# Patient Record
Sex: FEMALE | Race: WHITE | NOT HISPANIC OR LATINO | Employment: OTHER | URBAN - METROPOLITAN AREA
[De-identification: names, ages, dates, MRNs, and addresses within clinical notes are randomized per-mention and may not be internally consistent; named-entity substitution may affect disease eponyms.]

---

## 2017-01-01 ENCOUNTER — TRANSCRIBE ORDERS (OUTPATIENT)
Dept: ADMINISTRATIVE | Facility: HOSPITAL | Age: 82
End: 2017-01-01

## 2018-01-01 ENCOUNTER — APPOINTMENT (INPATIENT)
Dept: CT IMAGING | Facility: HOSPITAL | Age: 83
DRG: 177 | End: 2018-01-01
Payer: MEDICARE

## 2018-01-01 ENCOUNTER — APPOINTMENT (EMERGENCY)
Dept: RADIOLOGY | Facility: HOSPITAL | Age: 83
DRG: 177 | End: 2018-01-01
Payer: MEDICARE

## 2018-01-01 ENCOUNTER — HOSPITAL ENCOUNTER (INPATIENT)
Facility: HOSPITAL | Age: 83
LOS: 3 days | DRG: 177 | End: 2018-01-17
Attending: EMERGENCY MEDICINE
Payer: MEDICARE

## 2018-01-01 VITALS
DIASTOLIC BLOOD PRESSURE: 42 MMHG | HEART RATE: 97 BPM | OXYGEN SATURATION: 91 % | SYSTOLIC BLOOD PRESSURE: 102 MMHG | TEMPERATURE: 99.9 F | HEIGHT: 60 IN | RESPIRATION RATE: 22 BRPM | BODY MASS INDEX: 32.3 KG/M2 | WEIGHT: 164.5 LBS

## 2018-01-01 DIAGNOSIS — N18.9 CHRONIC RENAL INSUFFICIENCY: ICD-10-CM
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
## 2018-01-01 DIAGNOSIS — J18.9 HOSPITAL-ACQUIRED PNEUMONIA: Primary | ICD-10-CM

## 2018-01-01 DIAGNOSIS — E86.0 DEHYDRATION: ICD-10-CM

## 2018-01-01 LAB
ABO GROUP BLD: NORMAL
ALBUMIN SERPL BCP-MCNC: 1.9 G/DL (ref 3.5–5)
ALBUMIN SERPL BCP-MCNC: 2.3 G/DL (ref 3.5–5)
ALBUMIN SERPL BCP-MCNC: 2.7 G/DL (ref 3.5–5)
ALP SERPL-CCNC: 32 U/L (ref 46–116)
ALP SERPL-CCNC: 38 U/L (ref 46–116)
ALP SERPL-CCNC: 42 U/L (ref 46–116)
ALT SERPL W P-5'-P-CCNC: 15 U/L (ref 12–78)
ALT SERPL W P-5'-P-CCNC: 17 U/L (ref 12–78)
ALT SERPL W P-5'-P-CCNC: 19 U/L (ref 12–78)
ANION GAP SERPL CALCULATED.3IONS-SCNC: 11 MMOL/L (ref 4–13)
ANION GAP SERPL CALCULATED.3IONS-SCNC: 4 MMOL/L (ref 4–13)
ANION GAP SERPL CALCULATED.3IONS-SCNC: 7 MMOL/L (ref 4–13)
ANION GAP SERPL CALCULATED.3IONS-SCNC: 8 MMOL/L (ref 4–13)
ANISOCYTOSIS BLD QL SMEAR: PRESENT
APTT PPP: 60 SECONDS (ref 23–35)
AST SERPL W P-5'-P-CCNC: 15 U/L (ref 5–45)
AST SERPL W P-5'-P-CCNC: 19 U/L (ref 5–45)
AST SERPL W P-5'-P-CCNC: 20 U/L (ref 5–45)
ATRIAL RATE: 170 BPM
BASO STIPL BLD QL SMEAR: PRESENT
BASOPHILS # BLD AUTO: 0.03 THOUSANDS/ΜL (ref 0–0.1)
BASOPHILS # BLD AUTO: 0.03 THOUSANDS/ΜL (ref 0–0.1)
BASOPHILS # BLD MANUAL: 0 THOUSAND/UL (ref 0–0.1)
BASOPHILS NFR BLD AUTO: 1 % (ref 0–1)
BASOPHILS NFR BLD AUTO: 1 % (ref 0–1)
BASOPHILS NFR MAR MANUAL: 0 % (ref 0–1)
BILIRUB SERPL-MCNC: 0.4 MG/DL (ref 0.2–1)
BILIRUB SERPL-MCNC: 0.5 MG/DL (ref 0.2–1)
BILIRUB SERPL-MCNC: 0.5 MG/DL (ref 0.2–1)
BLD GP AB SCN SERPL QL: NEGATIVE
BUN SERPL-MCNC: 19 MG/DL (ref 5–25)
BUN SERPL-MCNC: 20 MG/DL (ref 5–25)
BUN SERPL-MCNC: 20 MG/DL (ref 5–25)
BUN SERPL-MCNC: 24 MG/DL (ref 5–25)
BUN SERPL-MCNC: 25 MG/DL (ref 5–25)
BUN SERPL-MCNC: 27 MG/DL (ref 5–25)
CALCIUM SERPL-MCNC: 7.1 MG/DL (ref 8.3–10.1)
CALCIUM SERPL-MCNC: 8.7 MG/DL (ref 8.3–10.1)
CALCIUM SERPL-MCNC: 8.9 MG/DL (ref 8.3–10.1)
CALCIUM SERPL-MCNC: 9.1 MG/DL (ref 8.3–10.1)
CALCIUM SERPL-MCNC: 9.1 MG/DL (ref 8.3–10.1)
CALCIUM SERPL-MCNC: 9.4 MG/DL (ref 8.3–10.1)
CHLORIDE SERPL-SCNC: 109 MMOL/L (ref 100–108)
CHLORIDE SERPL-SCNC: 109 MMOL/L (ref 100–108)
CHLORIDE SERPL-SCNC: 110 MMOL/L (ref 100–108)
CHLORIDE SERPL-SCNC: 111 MMOL/L (ref 100–108)
CHLORIDE SERPL-SCNC: 111 MMOL/L (ref 100–108)
CHLORIDE SERPL-SCNC: 116 MMOL/L (ref 100–108)
CLARITY, POC: NORMAL
CO2 SERPL-SCNC: 26 MMOL/L (ref 21–32)
CO2 SERPL-SCNC: 27 MMOL/L (ref 21–32)
CO2 SERPL-SCNC: 28 MMOL/L (ref 21–32)
CO2 SERPL-SCNC: 28 MMOL/L (ref 21–32)
CO2 SERPL-SCNC: 29 MMOL/L (ref 21–32)
CO2 SERPL-SCNC: 33 MMOL/L (ref 21–32)
COLOR, POC: YELLOW
CREAT SERPL-MCNC: 1 MG/DL (ref 0.6–1.3)
CREAT SERPL-MCNC: 1.25 MG/DL (ref 0.6–1.3)
CREAT SERPL-MCNC: 1.3 MG/DL (ref 0.6–1.3)
CREAT SERPL-MCNC: 1.31 MG/DL (ref 0.6–1.3)
CREAT SERPL-MCNC: 1.35 MG/DL (ref 0.6–1.3)
CREAT SERPL-MCNC: 1.51 MG/DL (ref 0.6–1.3)
EOSINOPHIL # BLD AUTO: 0.04 THOUSAND/ΜL (ref 0–0.61)
EOSINOPHIL # BLD AUTO: 0.1 THOUSAND/ΜL (ref 0–0.61)
EOSINOPHIL # BLD MANUAL: 0 THOUSAND/UL (ref 0–0.4)
EOSINOPHIL NFR BLD AUTO: 1 % (ref 0–6)
EOSINOPHIL NFR BLD AUTO: 3 % (ref 0–6)
EOSINOPHIL NFR BLD MANUAL: 0 % (ref 0–6)
ERYTHROCYTE [DISTWIDTH] IN BLOOD BY AUTOMATED COUNT: 18.4 % (ref 11.6–15.1)
ERYTHROCYTE [DISTWIDTH] IN BLOOD BY AUTOMATED COUNT: 18.4 % (ref 11.6–15.1)
ERYTHROCYTE [DISTWIDTH] IN BLOOD BY AUTOMATED COUNT: 18.6 % (ref 11.6–15.1)
ERYTHROCYTE [DISTWIDTH] IN BLOOD BY AUTOMATED COUNT: 18.7 % (ref 11.6–15.1)
ERYTHROCYTE [DISTWIDTH] IN BLOOD BY AUTOMATED COUNT: 18.9 % (ref 11.6–15.1)
EXT BILIRUBIN, UA: NEGATIVE
EXT BLOOD URINE: NORMAL
EXT GLUCOSE, UA: NEGATIVE
EXT KETONES: NEGATIVE
EXT NITRITE, UA: NEGATIVE
EXT PH, UA: 6
EXT PROTEIN, UA: NORMAL
EXT SPECIFIC GRAVITY, UA: 1.01
EXT UROBILINOGEN: 0.2
FERRITIN SERPL-MCNC: 75 NG/ML (ref 8–388)
FLUAV AG SPEC QL: NORMAL
FLUBV AG SPEC QL: NORMAL
GFR SERPL CREATININE-BSD FRML MDRD: 31 ML/MIN/1.73SQ M
GFR SERPL CREATININE-BSD FRML MDRD: 35 ML/MIN/1.73SQ M
GFR SERPL CREATININE-BSD FRML MDRD: 37 ML/MIN/1.73SQ M
GFR SERPL CREATININE-BSD FRML MDRD: 37 ML/MIN/1.73SQ M
GFR SERPL CREATININE-BSD FRML MDRD: 39 ML/MIN/1.73SQ M
GFR SERPL CREATININE-BSD FRML MDRD: 51 ML/MIN/1.73SQ M
GLUCOSE SERPL-MCNC: 78 MG/DL (ref 65–140)
GLUCOSE SERPL-MCNC: 81 MG/DL (ref 65–140)
GLUCOSE SERPL-MCNC: 82 MG/DL (ref 65–140)
GLUCOSE SERPL-MCNC: 87 MG/DL (ref 65–140)
GLUCOSE SERPL-MCNC: 96 MG/DL (ref 65–140)
GLUCOSE SERPL-MCNC: 98 MG/DL (ref 65–140)
HCT VFR BLD AUTO: 24 % (ref 34.8–46.1)
HCT VFR BLD AUTO: 29.4 % (ref 34.8–46.1)
HCT VFR BLD AUTO: 30.7 % (ref 34.8–46.1)
HCT VFR BLD AUTO: 31.5 % (ref 34.8–46.1)
HCT VFR BLD AUTO: 32.1 % (ref 34.8–46.1)
HGB BLD-MCNC: 7 G/DL (ref 11.5–15.4)
HGB BLD-MCNC: 8.5 G/DL (ref 11.5–15.4)
HGB BLD-MCNC: 8.9 G/DL (ref 11.5–15.4)
HGB BLD-MCNC: 9 G/DL (ref 11.5–15.4)
HGB BLD-MCNC: 9.1 G/DL (ref 11.5–15.4)
INR PPP: 1.32 (ref 0.86–1.16)
IRON SATN MFR SERPL: 12 %
IRON SERPL-MCNC: 43 UG/DL (ref 50–170)
L PNEUMO1 AG UR QL IA.RAPID: NEGATIVE
LACTATE SERPL-SCNC: 1 MMOL/L (ref 0.5–2)
LG PLATELETS BLD QL SMEAR: PRESENT
LYMPHOCYTES # BLD AUTO: 0.19 THOUSAND/UL (ref 0.6–4.47)
LYMPHOCYTES # BLD AUTO: 0.27 THOUSANDS/ΜL (ref 0.6–4.47)
LYMPHOCYTES # BLD AUTO: 0.44 THOUSANDS/ΜL (ref 0.6–4.47)
LYMPHOCYTES # BLD AUTO: 3 % (ref 14–44)
LYMPHOCYTES NFR BLD AUTO: 7 % (ref 14–44)
LYMPHOCYTES NFR BLD AUTO: 9 % (ref 14–44)
MAGNESIUM SERPL-MCNC: 1.3 MG/DL (ref 1.6–2.6)
MAGNESIUM SERPL-MCNC: 1.7 MG/DL (ref 1.6–2.6)
MAGNESIUM SERPL-MCNC: 1.8 MG/DL (ref 1.6–2.6)
MCH RBC QN AUTO: 27.7 PG (ref 26.8–34.3)
MCH RBC QN AUTO: 28 PG (ref 26.8–34.3)
MCH RBC QN AUTO: 28.3 PG (ref 26.8–34.3)
MCH RBC QN AUTO: 28.5 PG (ref 26.8–34.3)
MCH RBC QN AUTO: 28.7 PG (ref 26.8–34.3)
MCHC RBC AUTO-ENTMCNC: 28.3 G/DL (ref 31.4–37.4)
MCHC RBC AUTO-ENTMCNC: 28.6 G/DL (ref 31.4–37.4)
MCHC RBC AUTO-ENTMCNC: 28.9 G/DL (ref 31.4–37.4)
MCHC RBC AUTO-ENTMCNC: 29 G/DL (ref 31.4–37.4)
MCHC RBC AUTO-ENTMCNC: 29.2 G/DL (ref 31.4–37.4)
MCV RBC AUTO: 98 FL (ref 82–98)
MONOCYTES # BLD AUTO: 0 THOUSAND/UL (ref 0–1.22)
MONOCYTES # BLD AUTO: 0.32 THOUSAND/ΜL (ref 0.17–1.22)
MONOCYTES # BLD AUTO: 0.38 THOUSAND/ΜL (ref 0.17–1.22)
MONOCYTES NFR BLD AUTO: 8 % (ref 4–12)
MONOCYTES NFR BLD AUTO: 9 % (ref 4–12)
MONOCYTES NFR BLD: 0 % (ref 4–12)
MRSA NOSE QL CULT: NORMAL
NEUTROPHILS # BLD AUTO: 3.05 THOUSANDS/ΜL (ref 1.85–7.62)
NEUTROPHILS # BLD AUTO: 3.87 THOUSANDS/ΜL (ref 1.85–7.62)
NEUTROPHILS # BLD MANUAL: 6.17 THOUSAND/UL (ref 1.85–7.62)
NEUTS SEG NFR BLD AUTO: 80 % (ref 43–75)
NEUTS SEG NFR BLD AUTO: 81 % (ref 43–75)
NEUTS SEG NFR BLD AUTO: 97 % (ref 43–75)
NRBC BLD AUTO-RTO: 1 /100 WBC (ref 0–2)
PHOSPHATE SERPL-MCNC: 2.1 MG/DL (ref 2.3–4.1)
PHOSPHATE SERPL-MCNC: 2.7 MG/DL (ref 2.3–4.1)
PLATELET # BLD AUTO: 136 THOUSANDS/UL (ref 149–390)
PLATELET # BLD AUTO: 149 THOUSANDS/UL (ref 149–390)
PLATELET # BLD AUTO: 164 THOUSANDS/UL (ref 149–390)
PLATELET # BLD AUTO: 167 THOUSANDS/UL (ref 149–390)
PLATELET # BLD AUTO: 190 THOUSANDS/UL (ref 149–390)
PLATELET BLD QL SMEAR: ADEQUATE
PMV BLD AUTO: 10 FL (ref 8.9–12.7)
PMV BLD AUTO: 10.4 FL (ref 8.9–12.7)
PMV BLD AUTO: 10.6 FL (ref 8.9–12.7)
PMV BLD AUTO: 10.7 FL (ref 8.9–12.7)
PMV BLD AUTO: 10.8 FL (ref 8.9–12.7)
POTASSIUM SERPL-SCNC: 2.6 MMOL/L (ref 3.5–5.3)
POTASSIUM SERPL-SCNC: 3.2 MMOL/L (ref 3.5–5.3)
POTASSIUM SERPL-SCNC: 3.2 MMOL/L (ref 3.5–5.3)
POTASSIUM SERPL-SCNC: 3.3 MMOL/L (ref 3.5–5.3)
POTASSIUM SERPL-SCNC: 3.3 MMOL/L (ref 3.5–5.3)
POTASSIUM SERPL-SCNC: 3.7 MMOL/L (ref 3.5–5.3)
PROT SERPL-MCNC: 4.1 G/DL (ref 6.4–8.2)
PROT SERPL-MCNC: 5 G/DL (ref 6.4–8.2)
PROT SERPL-MCNC: 5.4 G/DL (ref 6.4–8.2)
PROTHROMBIN TIME: 16.8 SECONDS (ref 12.1–14.4)
QRS AXIS: -11 DEGREES
QRSD INTERVAL: 78 MS
QT INTERVAL: 398 MS
QTC INTERVAL: 420 MS
RBC # BLD AUTO: 2.44 MILLION/UL (ref 3.81–5.12)
RBC # BLD AUTO: 3 MILLION/UL (ref 3.81–5.12)
RBC # BLD AUTO: 3.12 MILLION/UL (ref 3.81–5.12)
RBC # BLD AUTO: 3.21 MILLION/UL (ref 3.81–5.12)
RBC # BLD AUTO: 3.28 MILLION/UL (ref 3.81–5.12)
RH BLD: POSITIVE
RSV B RNA SPEC QL NAA+PROBE: NORMAL
S PNEUM AG UR QL: NEGATIVE
SODIUM SERPL-SCNC: 145 MMOL/L (ref 136–145)
SODIUM SERPL-SCNC: 146 MMOL/L (ref 136–145)
SODIUM SERPL-SCNC: 146 MMOL/L (ref 136–145)
SODIUM SERPL-SCNC: 147 MMOL/L (ref 136–145)
SODIUM SERPL-SCNC: 148 MMOL/L (ref 136–145)
SODIUM SERPL-SCNC: 149 MMOL/L (ref 136–145)
SPECIMEN EXPIRATION DATE: NORMAL
T WAVE AXIS: 69 DEGREES
TIBC SERPL-MCNC: 346 UG/DL (ref 250–450)
TOTAL CELLS COUNTED SPEC: 100
TROPONIN I SERPL-MCNC: 0.02 NG/ML
TSH SERPL DL<=0.05 MIU/L-ACNC: 4.25 UIU/ML (ref 0.36–3.74)
VENTRICULAR RATE: 67 BPM
WBC # BLD AUTO: 3.77 THOUSAND/UL (ref 4.31–10.16)
WBC # BLD AUTO: 4.4 THOUSAND/UL (ref 4.31–10.16)
WBC # BLD AUTO: 4.46 THOUSAND/UL (ref 4.31–10.16)
WBC # BLD AUTO: 4.76 THOUSAND/UL (ref 4.31–10.16)
WBC # BLD AUTO: 6.36 THOUSAND/UL (ref 4.31–10.16)
WBC # BLD EST: NORMAL 10*3/UL

## 2018-01-01 PROCEDURE — 85025 COMPLETE CBC W/AUTO DIFF WBC: CPT | Performed by: PHYSICIAN ASSISTANT

## 2018-01-01 PROCEDURE — 80053 COMPREHEN METABOLIC PANEL: CPT | Performed by: PHYSICIAN ASSISTANT

## 2018-01-01 PROCEDURE — 83735 ASSAY OF MAGNESIUM: CPT | Performed by: PHYSICIAN ASSISTANT

## 2018-01-01 PROCEDURE — 85007 BL SMEAR W/DIFF WBC COUNT: CPT | Performed by: PHYSICIAN ASSISTANT

## 2018-01-01 PROCEDURE — 96374 THER/PROPH/DIAG INJ IV PUSH: CPT

## 2018-01-01 PROCEDURE — 87040 BLOOD CULTURE FOR BACTERIA: CPT | Performed by: PHYSICIAN ASSISTANT

## 2018-01-01 PROCEDURE — 87081 CULTURE SCREEN ONLY: CPT | Performed by: PHYSICIAN ASSISTANT

## 2018-01-01 PROCEDURE — 80048 BASIC METABOLIC PNL TOTAL CA: CPT | Performed by: PHYSICIAN ASSISTANT

## 2018-01-01 PROCEDURE — 84443 ASSAY THYROID STIM HORMONE: CPT | Performed by: PHYSICIAN ASSISTANT

## 2018-01-01 PROCEDURE — 83605 ASSAY OF LACTIC ACID: CPT | Performed by: PHYSICIAN ASSISTANT

## 2018-01-01 PROCEDURE — 93005 ELECTROCARDIOGRAM TRACING: CPT | Performed by: PHYSICIAN ASSISTANT

## 2018-01-01 PROCEDURE — 85027 COMPLETE CBC AUTOMATED: CPT | Performed by: PHYSICIAN ASSISTANT

## 2018-01-01 PROCEDURE — 94668 MNPJ CHEST WALL SBSQ: CPT

## 2018-01-01 PROCEDURE — 84100 ASSAY OF PHOSPHORUS: CPT | Performed by: PHYSICIAN ASSISTANT

## 2018-01-01 PROCEDURE — 70450 CT HEAD/BRAIN W/O DYE: CPT

## 2018-01-01 PROCEDURE — 86850 RBC ANTIBODY SCREEN: CPT | Performed by: PHYSICIAN ASSISTANT

## 2018-01-01 PROCEDURE — 84484 ASSAY OF TROPONIN QUANT: CPT | Performed by: PHYSICIAN ASSISTANT

## 2018-01-01 PROCEDURE — 99285 EMERGENCY DEPT VISIT HI MDM: CPT

## 2018-01-01 PROCEDURE — 86901 BLOOD TYPING SEROLOGIC RH(D): CPT | Performed by: PHYSICIAN ASSISTANT

## 2018-01-01 PROCEDURE — 85610 PROTHROMBIN TIME: CPT | Performed by: PHYSICIAN ASSISTANT

## 2018-01-01 PROCEDURE — 94664 DEMO&/EVAL PT USE INHALER: CPT

## 2018-01-01 PROCEDURE — 94760 N-INVAS EAR/PLS OXIMETRY 1: CPT

## 2018-01-01 PROCEDURE — 83540 ASSAY OF IRON: CPT | Performed by: PHYSICIAN ASSISTANT

## 2018-01-01 PROCEDURE — 81002 URINALYSIS NONAUTO W/O SCOPE: CPT | Performed by: PHYSICIAN ASSISTANT

## 2018-01-01 PROCEDURE — 82728 ASSAY OF FERRITIN: CPT | Performed by: PHYSICIAN ASSISTANT

## 2018-01-01 PROCEDURE — 36415 COLL VENOUS BLD VENIPUNCTURE: CPT | Performed by: PHYSICIAN ASSISTANT

## 2018-01-01 PROCEDURE — 83550 IRON BINDING TEST: CPT | Performed by: PHYSICIAN ASSISTANT

## 2018-01-01 PROCEDURE — 92610 EVALUATE SWALLOWING FUNCTION: CPT

## 2018-01-01 PROCEDURE — 71046 X-RAY EXAM CHEST 2 VIEWS: CPT

## 2018-01-01 PROCEDURE — 96361 HYDRATE IV INFUSION ADD-ON: CPT

## 2018-01-01 PROCEDURE — 87798 DETECT AGENT NOS DNA AMP: CPT | Performed by: PHYSICIAN ASSISTANT

## 2018-01-01 PROCEDURE — 86900 BLOOD TYPING SEROLOGIC ABO: CPT | Performed by: PHYSICIAN ASSISTANT

## 2018-01-01 PROCEDURE — 93005 ELECTROCARDIOGRAM TRACING: CPT

## 2018-01-01 PROCEDURE — 92526 ORAL FUNCTION THERAPY: CPT

## 2018-01-01 PROCEDURE — 85730 THROMBOPLASTIN TIME PARTIAL: CPT | Performed by: PHYSICIAN ASSISTANT

## 2018-01-01 PROCEDURE — 87449 NOS EACH ORGANISM AG IA: CPT | Performed by: PHYSICIAN ASSISTANT

## 2018-01-01 RX ORDER — IRON POLYSACCHARIDE COMPLEX 150 MG
150 CAPSULE ORAL 2 TIMES DAILY
Status: DISCONTINUED | OUTPATIENT
Start: 2018-01-01 | End: 2018-01-01 | Stop reason: HOSPADM

## 2018-01-01 RX ORDER — ALBUTEROL SULFATE 2.5 MG/3ML
2.5 SOLUTION RESPIRATORY (INHALATION) EVERY 6 HOURS PRN
Status: DISCONTINUED | OUTPATIENT
Start: 2018-01-01 | End: 2018-01-01 | Stop reason: HOSPADM

## 2018-01-01 RX ORDER — GUAIFENESIN 600 MG
1200 TABLET, EXTENDED RELEASE 12 HR ORAL EVERY 12 HOURS SCHEDULED
COMMUNITY
End: 2018-01-01 | Stop reason: HOSPADM

## 2018-01-01 RX ORDER — PANTOPRAZOLE SODIUM 40 MG/1
40 TABLET, DELAYED RELEASE ORAL DAILY
Status: DISCONTINUED | OUTPATIENT
Start: 2018-01-01 | End: 2018-01-01 | Stop reason: HOSPADM

## 2018-01-01 RX ORDER — SODIUM CHLORIDE 450 MG/100ML
75 INJECTION, SOLUTION INTRAVENOUS ONCE
Status: DISCONTINUED | OUTPATIENT
Start: 2018-01-01 | End: 2018-01-01

## 2018-01-01 RX ORDER — SACCHAROMYCES BOULARDII 250 MG
250 CAPSULE ORAL 2 TIMES DAILY
COMMUNITY
End: 2018-01-01 | Stop reason: HOSPADM

## 2018-01-01 RX ORDER — ASPIRIN 81 MG/1
81 TABLET ORAL DAILY
Status: DISCONTINUED | OUTPATIENT
Start: 2018-01-01 | End: 2018-01-01 | Stop reason: HOSPADM

## 2018-01-01 RX ORDER — FUROSEMIDE 10 MG/ML
20 INJECTION INTRAMUSCULAR; INTRAVENOUS ONCE
Status: COMPLETED | OUTPATIENT
Start: 2018-01-01 | End: 2018-01-01

## 2018-01-01 RX ORDER — ARGININE/GLUTAMINE 7.5G-10G
1 PACKET (EA) ORAL DAILY
COMMUNITY
End: 2018-01-01 | Stop reason: HOSPADM

## 2018-01-01 RX ORDER — GUAIFENESIN 600 MG
1200 TABLET, EXTENDED RELEASE 12 HR ORAL EVERY 12 HOURS SCHEDULED
Status: DISCONTINUED | OUTPATIENT
Start: 2018-01-01 | End: 2018-01-01 | Stop reason: HOSPADM

## 2018-01-01 RX ORDER — VANCOMYCIN HYDROCHLORIDE 1 G/200ML
15 INJECTION, SOLUTION INTRAVENOUS ONCE
Status: DISCONTINUED | OUTPATIENT
Start: 2018-01-01 | End: 2018-01-01

## 2018-01-01 RX ORDER — IRON POLYSACCHARIDE COMPLEX 150 MG
150 CAPSULE ORAL 2 TIMES DAILY
COMMUNITY
End: 2018-01-01 | Stop reason: HOSPADM

## 2018-01-01 RX ORDER — OSELTAMIVIR PHOSPHATE 30 MG/1
30 CAPSULE ORAL EVERY 12 HOURS SCHEDULED
Status: DISCONTINUED | OUTPATIENT
Start: 2018-01-01 | End: 2018-01-01

## 2018-01-01 RX ORDER — OSELTAMIVIR PHOSPHATE 30 MG/1
30 CAPSULE ORAL EVERY 24 HOURS
Status: DISCONTINUED | OUTPATIENT
Start: 2018-01-01 | End: 2018-01-01

## 2018-01-01 RX ORDER — GUAIFENESIN/DEXTROMETHORPHAN 100-10MG/5
10 SYRUP ORAL EVERY 4 HOURS PRN
Status: DISCONTINUED | OUTPATIENT
Start: 2018-01-01 | End: 2018-01-01 | Stop reason: HOSPADM

## 2018-01-01 RX ORDER — ACETAMINOPHEN 325 MG/1
650 TABLET ORAL EVERY 6 HOURS PRN
Status: DISCONTINUED | OUTPATIENT
Start: 2018-01-01 | End: 2018-01-01 | Stop reason: HOSPADM

## 2018-01-01 RX ORDER — ISOSORBIDE MONONITRATE 30 MG/1
30 TABLET, EXTENDED RELEASE ORAL DAILY
Status: DISCONTINUED | OUTPATIENT
Start: 2018-01-01 | End: 2018-01-01 | Stop reason: HOSPADM

## 2018-01-01 RX ORDER — FUROSEMIDE 40 MG/1
20 TABLET ORAL DAILY
Status: CANCELLED | OUTPATIENT
Start: 2018-01-01

## 2018-01-01 RX ORDER — NYSTATIN 100000 [USP'U]/G
1 POWDER TOPICAL 3 TIMES DAILY
Status: DISCONTINUED | OUTPATIENT
Start: 2018-01-01 | End: 2018-01-01 | Stop reason: HOSPADM

## 2018-01-01 RX ORDER — HALOPERIDOL 5 MG/ML
2 INJECTION INTRAMUSCULAR ONCE
Status: COMPLETED | OUTPATIENT
Start: 2018-01-01 | End: 2018-01-01

## 2018-01-01 RX ORDER — PANTOPRAZOLE SODIUM 40 MG/1
40 TABLET, DELAYED RELEASE ORAL DAILY
COMMUNITY
End: 2018-01-01 | Stop reason: HOSPADM

## 2018-01-01 RX ORDER — VANCOMYCIN HYDROCHLORIDE 1 G/200ML
15 INJECTION, SOLUTION INTRAVENOUS EVERY 12 HOURS
Status: DISCONTINUED | OUTPATIENT
Start: 2018-01-01 | End: 2018-01-01

## 2018-01-01 RX ORDER — DEXTROSE AND SODIUM CHLORIDE 5; .9 G/100ML; G/100ML
75 INJECTION, SOLUTION INTRAVENOUS CONTINUOUS
Status: DISCONTINUED | OUTPATIENT
Start: 2018-01-01 | End: 2018-01-01

## 2018-01-01 RX ORDER — CALCIUM CARBONATE 200(500)MG
1000 TABLET,CHEWABLE ORAL DAILY PRN
Status: DISCONTINUED | OUTPATIENT
Start: 2018-01-01 | End: 2018-01-01 | Stop reason: HOSPADM

## 2018-01-01 RX ORDER — ONDANSETRON 2 MG/ML
4 INJECTION INTRAMUSCULAR; INTRAVENOUS EVERY 6 HOURS PRN
Status: DISCONTINUED | OUTPATIENT
Start: 2018-01-01 | End: 2018-01-01 | Stop reason: HOSPADM

## 2018-01-01 RX ORDER — TAMOXIFEN CITRATE 10 MG/1
10 TABLET ORAL DAILY
COMMUNITY
End: 2018-01-01 | Stop reason: HOSPADM

## 2018-01-01 RX ORDER — TAMOXIFEN CITRATE 10 MG/1
10 TABLET ORAL DAILY
Status: DISCONTINUED | OUTPATIENT
Start: 2018-01-01 | End: 2018-01-01 | Stop reason: HOSPADM

## 2018-01-01 RX ORDER — ASPIRIN 81 MG/1
81 TABLET ORAL
COMMUNITY
End: 2018-01-01 | Stop reason: HOSPADM

## 2018-01-01 RX ORDER — SODIUM CHLORIDE 450 MG/100ML
75 INJECTION, SOLUTION INTRAVENOUS ONCE
Status: COMPLETED | OUTPATIENT
Start: 2018-01-01 | End: 2018-01-01

## 2018-01-01 RX ORDER — PREDNISONE 10 MG/1
10 TABLET ORAL DAILY
COMMUNITY
End: 2018-01-01 | Stop reason: HOSPADM

## 2018-01-01 RX ORDER — SACCHAROMYCES BOULARDII 250 MG
250 CAPSULE ORAL 2 TIMES DAILY
Status: DISCONTINUED | OUTPATIENT
Start: 2018-01-01 | End: 2018-01-01 | Stop reason: HOSPADM

## 2018-01-01 RX ORDER — HEPARIN SODIUM 5000 [USP'U]/ML
5000 INJECTION, SOLUTION INTRAVENOUS; SUBCUTANEOUS EVERY 8 HOURS SCHEDULED
Status: DISCONTINUED | OUTPATIENT
Start: 2018-01-01 | End: 2018-01-01 | Stop reason: HOSPADM

## 2018-01-01 RX ORDER — OLANZAPINE 10 MG/1
2.5 INJECTION, POWDER, LYOPHILIZED, FOR SOLUTION INTRAMUSCULAR ONCE
Status: DISCONTINUED | OUTPATIENT
Start: 2018-01-01 | End: 2018-01-01 | Stop reason: HOSPADM

## 2018-01-01 RX ORDER — DOCUSATE SODIUM 100 MG/1
100 CAPSULE, LIQUID FILLED ORAL 2 TIMES DAILY
Status: DISCONTINUED | OUTPATIENT
Start: 2018-01-01 | End: 2018-01-01 | Stop reason: HOSPADM

## 2018-01-01 RX ORDER — LEVALBUTEROL 1.25 MG/.5ML
1.25 SOLUTION, CONCENTRATE RESPIRATORY (INHALATION) EVERY 8 HOURS PRN
Status: DISCONTINUED | OUTPATIENT
Start: 2018-01-01 | End: 2018-01-01

## 2018-01-01 RX ORDER — VANCOMYCIN HYDROCHLORIDE 1 G/200ML
15 INJECTION, SOLUTION INTRAVENOUS ONCE
Status: COMPLETED | OUTPATIENT
Start: 2018-01-01 | End: 2018-01-01

## 2018-01-01 RX ORDER — POTASSIUM CHLORIDE 14.9 MG/ML
20 INJECTION INTRAVENOUS
Status: COMPLETED | OUTPATIENT
Start: 2018-01-01 | End: 2018-01-01

## 2018-01-01 RX ORDER — ALLOPURINOL 300 MG/1
300 TABLET ORAL
COMMUNITY
End: 2018-01-01 | Stop reason: HOSPADM

## 2018-01-01 RX ORDER — PREDNISONE 10 MG/1
10 TABLET ORAL DAILY
Status: DISCONTINUED | OUTPATIENT
Start: 2018-01-01 | End: 2018-01-01 | Stop reason: HOSPADM

## 2018-01-01 RX ORDER — FUROSEMIDE 20 MG/1
20 TABLET ORAL DAILY
COMMUNITY
End: 2018-01-01 | Stop reason: HOSPADM

## 2018-01-01 RX ORDER — FUROSEMIDE 10 MG/ML
20 INJECTION INTRAMUSCULAR; INTRAVENOUS ONCE
Status: DISCONTINUED | OUTPATIENT
Start: 2018-01-01 | End: 2018-01-01

## 2018-01-01 RX ORDER — ISOSORBIDE MONONITRATE 30 MG/1
30 TABLET, EXTENDED RELEASE ORAL DAILY
COMMUNITY
End: 2018-01-01 | Stop reason: HOSPADM

## 2018-01-01 RX ORDER — SODIUM CHLORIDE 9 MG/ML
125 INJECTION, SOLUTION INTRAVENOUS CONTINUOUS
Status: DISCONTINUED | OUTPATIENT
Start: 2018-01-01 | End: 2018-01-01 | Stop reason: HOSPADM

## 2018-01-01 RX ORDER — NYSTATIN 100000 [USP'U]/G
1 POWDER TOPICAL 3 TIMES DAILY
COMMUNITY
End: 2018-01-01 | Stop reason: HOSPADM

## 2018-01-01 RX ADMIN — HEPARIN SODIUM 5000 UNITS: 5000 INJECTION, SOLUTION INTRAVENOUS; SUBCUTANEOUS at 13:18

## 2018-01-01 RX ADMIN — DEXTROSE AND SODIUM CHLORIDE 75 ML/HR: 5; .9 INJECTION, SOLUTION INTRAVENOUS at 21:07

## 2018-01-01 RX ADMIN — CEFEPIME HYDROCHLORIDE 2000 MG: 2 INJECTION, POWDER, FOR SOLUTION INTRAVENOUS at 15:59

## 2018-01-01 RX ADMIN — Medication 250 MG: at 08:17

## 2018-01-01 RX ADMIN — POTASSIUM CHLORIDE 20 MEQ: 200 INJECTION, SOLUTION INTRAVENOUS at 22:31

## 2018-01-01 RX ADMIN — METRONIDAZOLE 500 MG: 500 INJECTION, SOLUTION INTRAVENOUS at 01:00

## 2018-01-01 RX ADMIN — POTASSIUM CHLORIDE 20 MEQ: 200 INJECTION, SOLUTION INTRAVENOUS at 20:40

## 2018-01-01 RX ADMIN — CEFEPIME HYDROCHLORIDE 2000 MG: 2 INJECTION, POWDER, FOR SOLUTION INTRAVENOUS at 16:46

## 2018-01-01 RX ADMIN — SODIUM CHLORIDE 1000 ML: 0.9 INJECTION, SOLUTION INTRAVENOUS at 14:59

## 2018-01-01 RX ADMIN — NYSTATIN 1 APPLICATION: 100000 POWDER TOPICAL at 22:13

## 2018-01-01 RX ADMIN — GUAIFENESIN 1200 MG: 600 TABLET, EXTENDED RELEASE ORAL at 21:44

## 2018-01-01 RX ADMIN — APIXABAN 2.5 MG: 2.5 TABLET, FILM COATED ORAL at 08:21

## 2018-01-01 RX ADMIN — METRONIDAZOLE 500 MG: 500 INJECTION, SOLUTION INTRAVENOUS at 00:35

## 2018-01-01 RX ADMIN — METRONIDAZOLE 500 MG: 500 INJECTION, SOLUTION INTRAVENOUS at 08:17

## 2018-01-01 RX ADMIN — VANCOMYCIN HYDROCHLORIDE 1000 MG: 1 INJECTION, SOLUTION INTRAVENOUS at 20:39

## 2018-01-01 RX ADMIN — APIXABAN 2.5 MG: 2.5 TABLET, FILM COATED ORAL at 17:20

## 2018-01-01 RX ADMIN — VANCOMYCIN HYDROCHLORIDE 750 MG: 750 INJECTION, SOLUTION INTRAVENOUS at 19:05

## 2018-01-01 RX ADMIN — HALOPERIDOL LACTATE 2 MG: 5 INJECTION, SOLUTION INTRAMUSCULAR at 18:06

## 2018-01-01 RX ADMIN — METRONIDAZOLE 500 MG: 500 INJECTION, SOLUTION INTRAVENOUS at 08:15

## 2018-01-01 RX ADMIN — ASPIRIN 81 MG: 81 TABLET, COATED ORAL at 08:21

## 2018-01-01 RX ADMIN — HEPARIN SODIUM 5000 UNITS: 5000 INJECTION, SOLUTION INTRAVENOUS; SUBCUTANEOUS at 05:47

## 2018-01-01 RX ADMIN — TAMOXIFEN CITRATE 10 MG: 10 TABLET, FILM COATED ORAL at 08:19

## 2018-01-01 RX ADMIN — NYSTATIN 1 APPLICATION: 100000 POWDER TOPICAL at 08:18

## 2018-01-01 RX ADMIN — GUAIFENESIN 1200 MG: 600 TABLET, EXTENDED RELEASE ORAL at 08:21

## 2018-01-01 RX ADMIN — PANTOPRAZOLE SODIUM 40 MG: 40 TABLET, DELAYED RELEASE ORAL at 08:17

## 2018-01-01 RX ADMIN — METRONIDAZOLE 500 MG: 500 INJECTION, SOLUTION INTRAVENOUS at 17:21

## 2018-01-01 RX ADMIN — DOCUSATE SODIUM 100 MG: 100 CAPSULE, LIQUID FILLED ORAL at 21:43

## 2018-01-01 RX ADMIN — Medication 250 MG: at 08:20

## 2018-01-01 RX ADMIN — SODIUM CHLORIDE 75 ML/HR: 0.45 INJECTION, SOLUTION INTRAVENOUS at 13:14

## 2018-01-01 RX ADMIN — APIXABAN 2.5 MG: 2.5 TABLET, FILM COATED ORAL at 21:43

## 2018-01-01 RX ADMIN — HEPARIN SODIUM 5000 UNITS: 5000 INJECTION, SOLUTION INTRAVENOUS; SUBCUTANEOUS at 22:10

## 2018-01-01 RX ADMIN — ASPIRIN 81 MG: 81 TABLET, COATED ORAL at 08:18

## 2018-01-01 RX ADMIN — Medication 250 MG: at 21:44

## 2018-01-01 RX ADMIN — APIXABAN 2.5 MG: 2.5 TABLET, FILM COATED ORAL at 08:18

## 2018-01-01 RX ADMIN — Medication 150 MG: at 21:43

## 2018-01-01 RX ADMIN — NYSTATIN 1 APPLICATION: 100000 POWDER TOPICAL at 16:53

## 2018-01-01 RX ADMIN — PREDNISONE 10 MG: 10 TABLET ORAL at 08:18

## 2018-01-01 RX ADMIN — FUROSEMIDE 20 MG: 10 INJECTION, SOLUTION INTRAMUSCULAR; INTRAVENOUS at 04:12

## 2018-01-01 RX ADMIN — GUAIFENESIN 1200 MG: 600 TABLET, EXTENDED RELEASE ORAL at 08:17

## 2018-01-01 RX ADMIN — METRONIDAZOLE 500 MG: 500 INJECTION, SOLUTION INTRAVENOUS at 23:58

## 2018-01-01 RX ADMIN — METRONIDAZOLE 500 MG: 500 INJECTION, SOLUTION INTRAVENOUS at 07:41

## 2018-01-01 RX ADMIN — NYSTATIN 1 APPLICATION: 100000 POWDER TOPICAL at 08:23

## 2018-01-01 RX ADMIN — Medication 150 MG: at 08:17

## 2018-01-01 RX ADMIN — PREDNISONE 10 MG: 10 TABLET ORAL at 08:21

## 2018-01-01 RX ADMIN — OSELTAMIVIR PHOSPHATE 30 MG: 30 CAPSULE ORAL at 21:44

## 2018-01-01 RX ADMIN — NYSTATIN 1 APPLICATION: 100000 POWDER TOPICAL at 21:46

## 2018-01-01 RX ADMIN — METRONIDAZOLE 500 MG: 500 INJECTION, SOLUTION INTRAVENOUS at 18:01

## 2018-01-01 RX ADMIN — CEFEPIME HYDROCHLORIDE 2000 MG: 2 INJECTION, POWDER, FOR SOLUTION INTRAVENOUS at 16:30

## 2018-01-01 RX ADMIN — Medication 150 MG: at 08:21

## 2018-01-01 RX ADMIN — VANCOMYCIN HYDROCHLORIDE 750 MG: 750 INJECTION, SOLUTION INTRAVENOUS at 19:08

## 2018-01-01 RX ADMIN — METRONIDAZOLE 500 MG: 500 INJECTION, SOLUTION INTRAVENOUS at 17:11

## 2018-01-01 RX ADMIN — ISOSORBIDE MONONITRATE 30 MG: 30 TABLET, EXTENDED RELEASE ORAL at 08:17

## 2018-01-01 RX ADMIN — ISOSORBIDE MONONITRATE 30 MG: 30 TABLET, EXTENDED RELEASE ORAL at 08:21

## 2018-01-01 RX ADMIN — NYSTATIN 1 APPLICATION: 100000 POWDER TOPICAL at 16:30

## 2018-01-01 RX ADMIN — DOCUSATE SODIUM 100 MG: 100 CAPSULE, LIQUID FILLED ORAL at 08:17

## 2018-01-14 PROBLEM — Z86.711 HISTORY OF PULMONARY EMBOLUS (PE): Chronic | Status: ACTIVE | Noted: 2018-01-01

## 2018-01-14 PROBLEM — J18.9 HOSPITAL-ACQUIRED PNEUMONIA: Status: ACTIVE | Noted: 2018-01-01

## 2018-01-14 PROBLEM — D50.9 IRON DEFICIENCY ANEMIA: Chronic | Status: ACTIVE | Noted: 2018-01-01

## 2018-01-14 PROBLEM — J96.01 ACUTE RESPIRATORY FAILURE WITH HYPOXIA (HCC): Status: ACTIVE | Noted: 2018-01-01

## 2018-01-14 PROBLEM — E87.0 HYPERNATREMIA: Status: ACTIVE | Noted: 2017-01-01
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
## 2018-01-14 NOTE — ED PROVIDER NOTES
History  Chief Complaint   Patient presents with    Shortness of Breath     EMS reports pt was taken to [facility] one week ago due to being at [facility] for kidney failure  daughter states pt has been on O2 since then but unsure why  pt with decreased mentation and SOB today       History provided by:  Patient  Cough   Cough characteristics:  Harsh  Severity:  Moderate  Onset quality:  Gradual  Duration:  2 weeks  Timing:  Intermittent  Progression:  Worsening  Chronicity:  New  Smoker: no    Context: upper respiratory infection    Context: not animal exposure, not exposure to allergens, not fumes, not occupational exposure, not sick contacts, not smoke exposure, not weather changes and not with activity    Relieved by:  None tried  Worsened by:  Nothing  Ineffective treatments:  None tried  Associated symptoms: shortness of breath    Associated symptoms: no chest pain, no chills, no diaphoresis, no ear fullness, no ear pain, no eye discharge, no fever, no headaches, no myalgias, no rash, no rhinorrhea, no sinus congestion, no sore throat, no weight loss and no wheezing        Prior to Admission Medications   Prescriptions Last Dose Informant Patient Reported? Taking?    Amino Acids (ARGIMENT) PACK   Yes Yes   Sig: Take 1 each by mouth daily   allopurinol (ZYLOPRIM) 300 mg tablet   Yes No   Sig: Take 300 mg by mouth   apixaban (ELIQUIS) 2 5 mg   Yes Yes   Sig: Take 2 5 mg by mouth 2 (two) times a day   aspirin (ECOTRIN LOW STRENGTH) 81 mg EC tablet   Yes No   Sig: Take 81 mg by mouth   furosemide (LASIX) 20 mg tablet   Yes No   Sig: Take 20 mg by mouth daily   guaiFENesin (MUCINEX) 600 mg 12 hr tablet   Yes Yes   Sig: Take 1,200 mg by mouth every 12 (twelve) hours   iron polysaccharides (NIFEREX) 150 mg capsule   Yes Yes   Sig: Take 150 mg by mouth 2 (two) times a day   isosorbide mononitrate (IMDUR) 30 mg 24 hr tablet   Yes No   Sig: Take 30 mg by mouth daily   nystatin (MYCOSTATIN) powder   Yes Yes Sig: Apply 1 application topically 3 (three) times a day   pantoprazole (PROTONIX) 40 mg tablet   Yes No   Sig: Take 40 mg by mouth daily   predniSONE 10 mg tablet   Yes No   Sig: Take 10 mg by mouth daily   saccharomyces boulardii (FLORASTOR) 250 mg capsule   Yes Yes   Sig: Take 250 mg by mouth 2 (two) times a day   tamoxifen (NOLVADEX) 10 mg tablet   Yes No   Sig: Take 10 mg by mouth daily      Facility-Administered Medications: None       History reviewed  No pertinent past medical history  History reviewed  No pertinent surgical history  History reviewed  No pertinent family history  I have reviewed and agree with the history as documented  Social History   Substance Use Topics    Smoking status: Not on file    Smokeless tobacco: Not on file    Alcohol use Not on file        Review of Systems   Reason unable to perform ROS: age  Constitutional: Positive for activity change, appetite change and fatigue  Negative for chills, diaphoresis, fever and weight loss  HENT: Positive for congestion  Negative for dental problem, drooling, ear pain, mouth sores, postnasal drip, rhinorrhea, sinus pain, sinus pressure, sore throat and trouble swallowing  Eyes: Negative for pain, discharge, redness and itching  Respiratory: Positive for cough and shortness of breath  Negative for chest tightness and wheezing  Cardiovascular: Negative for chest pain  Gastrointestinal: Negative for abdominal pain, constipation, diarrhea, nausea and vomiting  Genitourinary: Negative for dysuria, hematuria and urgency  Musculoskeletal: Negative for myalgias  Skin: Negative for rash  Neurological: Positive for weakness  Negative for headaches  Psychiatric/Behavioral: Positive for confusion  All other systems reviewed and are negative        Physical Exam  ED Triage Vitals   Temperature Pulse Respirations Blood Pressure SpO2   01/14/18 1350 01/14/18 1350 01/14/18 1350 01/14/18 1350 01/14/18 1350   98 1 °F (36 7 °C) 69 14 161/92 91 %      Temp Source Heart Rate Source Patient Position - Orthostatic VS BP Location FiO2 (%)   01/14/18 1350 01/14/18 1350 01/14/18 1500 01/14/18 1500 --   Oral Monitor Lying Right arm       Pain Score       01/14/18 1350       No Pain           Orthostatic Vital Signs  Vitals:    01/14/18 1350 01/14/18 1500 01/14/18 1559 01/14/18 1600   BP: 161/92 170/84 168/98 (!) 176/89   Pulse: 69 66 72 68   Patient Position - Orthostatic VS:  Lying Sitting Lying       Physical Exam   Constitutional: She appears well-developed and well-nourished  No distress  HENT:   Head: Normocephalic  Right Ear: External ear normal    Left Ear: External ear normal    Nose: Nose normal    Dry mucous membranes, no erythema  Eyes: Conjunctivae are normal  Right eye exhibits no discharge  Left eye exhibits no discharge  No scleral icterus  Neck: Neck supple  No JVD present  No tracheal deviation present  No thyromegaly present  Cardiovascular: Normal rate, regular rhythm and normal heart sounds  Exam reveals no gallop and no friction rub  No murmur heard  Pulmonary/Chest: Effort normal  No stridor  She has no wheezes  She has no rales  Scattered rhonchi throughout   Abdominal: Soft  She exhibits no distension  There is no tenderness  There is no rebound and no guarding  Musculoskeletal: She exhibits no edema or tenderness  Lymphadenopathy:     She has no cervical adenopathy  Neurological: She is alert  Oriented to person   Skin: Skin is warm  Capillary refill takes less than 2 seconds  No rash noted  She is not diaphoretic  No erythema  Psychiatric: She has a normal mood and affect  Her behavior is normal  Judgment and thought content normal    Nursing note and vitals reviewed        ED Medications  Medications   cefepime (MAXIPIME) 2 g/50 mL dextrose IVPB (2,000 mg Intravenous New Bag 1/14/18 1559)   vancomycin (VANCOCIN) IVPB (premix) 1,000 mg (not administered)   metroNIDAZOLE (FLAGYL) IVPB (premix) 500 mg (not administered)   sodium chloride 0 9 % bolus 1,000 mL (1,000 mL Intravenous New Bag 1/14/18 1459)       Diagnostic Studies  Results Reviewed     Procedure Component Value Units Date/Time    Blood culture #1 [[id number]] Collected:  01/14/18 1550    Lab Status: In process Specimen:  Blood from Arm, Left Updated:  01/14/18 1553    Protime-INR [[id number]]  (Abnormal) Collected:  01/14/18 1448    Lab Status:  Final result Specimen:  Blood from Central Venous Line Updated:  01/14/18 1531     Protime 16 8 (H) seconds      INR 1 32 (H)    APTT [[id number]]  (Abnormal) Collected:  01/14/18 1448    Lab Status:  Final result Specimen:  Blood from Central Venous Line Updated:  01/14/18 1531     PTT 60 (H) seconds     Narrative: Therapeutic Heparin Range = 60-90 seconds    TSH [[id number]]  (Abnormal) Collected:  01/14/18 1447    Lab Status:  Final result Specimen:  Blood from Central Venous Line Updated:  01/14/18 1529     TSH 3RD GENERATON 4 247 (H) uIU/mL     Narrative:         Patients undergoing fluorescein dye angiography may retain small amounts of fluorescein in the body for 48-72 hours post procedure  Samples containing fluorescein can produce falsely depressed TSH values  If the patient had this procedure,a specimen should be resubmitted post fluorescein clearance  The recommended reference ranges for TSH during pregnancy are as follows:  First trimester 0 1 to 2 5 uIU/mL  Second trimester  0 2 to 3 0 uIU/mL  Third trimester 0 3 to 3 0 uIU/m      Magnesium [[id number]]  (Normal) Collected:  01/14/18 1447    Lab Status:  Final result Specimen:  Blood from Central Venous Line Updated:  01/14/18 1529     Magnesium 1 8 mg/dL     Lactic acid, plasma [[id number]]  (Normal) Collected:  01/14/18 1447    Lab Status:  Final result Specimen:  Blood from Central Venous Line Updated:  01/14/18 1523     LACTIC ACID 1 0 mmol/L     Narrative:         Result may be elevated if tourniquet was used during collection  Troponin I [[id number]]  (Normal) Collected:  01/14/18 1447    Lab Status:  Final result Specimen:  Blood from Central Venous Line Updated:  01/14/18 1521     Troponin I 0 02 ng/mL     Narrative:         [employer] Chemistry analyzer 99% cutoff is > 0 04 ng/mL in network labs    o cTnI 99% cutoff is useful only when applied to patients in the clinical setting of myocardial ischemia  o cTnI 99% cutoff should be interpreted in the context of clinical history, ECG findings and possibly cardiac imaging to establish correct diagnosis  o cTnI 99% cutoff may be suggestive but clearly not indicative of a coronary event without the clinical setting of myocardial ischemia  Comprehensive metabolic panel [[id number]]  (Abnormal) Collected:  01/14/18 1447    Lab Status:  Final result Specimen:  Blood from Central Venous Line Updated:  01/14/18 1519     Sodium 146 (H) mmol/L      Potassium 3 7 mmol/L      Chloride 109 (H) mmol/L      CO2 33 (H) mmol/L      Anion Gap 4 mmol/L      BUN 27 (H) mg/dL      Creatinine 1 35 (H) mg/dL      Glucose 81 mg/dL      Calcium 9 4 mg/dL      AST 19 U/L      ALT 19 U/L      Alkaline Phosphatase 42 (L) U/L      Total Protein 5 4 (L) g/dL      Albumin 2 7 (L) g/dL      Total Bilirubin 0 50 mg/dL      eGFR 35 ml/min/1 73sq m     Narrative:         National Kidney Disease Education Program recommendations are as follows:  GFR calculation is accurate only with a steady state creatinine  Chronic Kidney disease less than 60 ml/min/1 73 sq  meters  Kidney failure less than 15 ml/min/1 73 sq  meters      CBC and differential [[id number]]  (Abnormal) Collected:  01/14/18 1450    Lab Status:  Final result Specimen:  Blood from Central Venous Line Updated:  01/14/18 1505     WBC 4 76 Thousand/uL      RBC 3 12 (L) Million/uL      Hemoglobin 8 9 (L) g/dL      Hematocrit 30 7 (L) %      MCV 98 fL      MCH 28 5 pg      MCHC 29 0 (L) g/dL      RDW 18 6 (H) %      MPV 10 8 fL      Platelets 790 Thousands/uL Neutrophils Relative 81 (H) %      Lymphocytes Relative 9 (L) %      Monocytes Relative 8 %      Eosinophils Relative 1 %      Basophils Relative 1 %      Neutrophils Absolute 3 87 Thousands/µL      Lymphocytes Absolute 0 44 (L) Thousands/µL      Monocytes Absolute 0 38 Thousand/µL      Eosinophils Absolute 0 04 Thousand/µL      Basophils Absolute 0 03 Thousands/µL     Blood culture #2 [[id number]] Collected:  01/14/18 1448    Lab Status: In process Specimen:  Blood from Central Venous Line Updated:  01/14/18 1458    Influenza A/B and RSV by PCR (indicated for patients >2 mo of age) [[id number]] Collected:  01/14/18 1447    Lab Status:   In process Specimen:  Nasopharyngeal from Nasopharyngeal Swab Updated:  01/14/18 1458    POCT urinalysis dipstick [[id number]]     Lab Status:  No result Specimen:  Urine                  XR chest 2 views   ED Interpretation by Aj Sullivan PA-C (01/14 1523)   Pneumonia right base                 Procedures  ECG 12 Lead Documentation  Date/Time: 1/14/2018 2:23 PM  Performed by: Whit Steinberg  Authorized by: Velvet Díaz     Indications / Diagnosis:  Cough, chest congestion  ECG reviewed by me, the ED Provider: yes    Patient location:  ED  Previous ECG:     Previous ECG:  Compared to current    Comparison ECG info:  2/17/14    Similarity:  No change  Rate:     ECG rate assessment: normal    Rhythm:     Rhythm: sinus rhythm    Ectopy:     Ectopy: none    QRS:     QRS axis:  Normal  Conduction:     Conduction: abnormal      Abnormal conduction: 1st degree    ST segments:     ST segments:  Normal  T waves:     T waves: non-specific             Phone Contacts  ED Phone Contact    ED Course  ED Course                                MDM  Number of Diagnoses or Management Options  Chronic renal insufficiency: established and worsening  Dehydration: established and worsening  Hospital-acquired pneumonia: new and requires workup     Amount and/or Complexity of Data Reviewed  Clinical lab tests: ordered and reviewed  Tests in the radiology section of CPT®: ordered and reviewed  Tests in the medicine section of CPT®: ordered and reviewed    Risk of Complications, Morbidity, and/or Mortality  Presenting problems: high  Diagnostic procedures: high  Management options: high  General comments: Patient presents emergency room with coughing and does difficulty breathing  She was placed on oxygen at [facility] since her discharge from [place] last month  Her daughter states she is getting progressively worse  She was insistent that she be brought to the hospital for repeat evaluation  She was seen and evaluated  A pneumonia in the right lower lobe with a right pleural effusion was inspected  Patient was recently in the hospital so she was covered for a hospital-acquired pneumonia  Her daughter was also concerned of for aspiration due to the fact that she is having difficulty swallowing her food over the past 2 weeks  She has had increasing weakness  She was covered with cefepime, vancomycin, Flagyl  Her laboratory studies were reviewed    She was admitted to the [facility] service    Patient Progress  Patient progress: stable    CritCare Time    Disposition  Final diagnoses:   Hospital-acquired pneumonia - Acute right lower lobe   Dehydration   Chronic renal insufficiency     Time reflects when diagnosis was documented in both MDM as applicable and the Disposition within this note     Time User Action Codes Description Comment    1/14/2018  4:01 PM Petty Ochoa Add [J18 9] Hospital-acquired pneumonia     1/14/2018  4:02 PM [place] Ochoa Modify [J18 9] Hospital-acquired pneumonia Acute right lower lobe    1/14/2018  4:02 PM Petty Ochoa Add [E86 0] Dehydration     1/14/2018  4:02 PM [place] Ochoa Add [N18 9] Chronic renal insufficiency       ED Disposition     ED Disposition Condition Comment    Admit  Case was discussed with Dr Juan J Pagan and the patient's admission status was agreed to be Admission Status: inpatient status to the service of Dr Wellington Nogueira   Follow-up Information    None       Patient's Medications   Discharge Prescriptions    No medications on file     No discharge procedures on file      ED Provider  Electronically Signed by           Lisa Sandoval PA-C  01/14/18 4579

## 2018-01-14 NOTE — ASSESSMENT & PLAN NOTE
· Shortness of breath currently on 4 L nasal cannula oxygen saturation at 98%  · Continue home medication-Eliquis

## 2018-01-14 NOTE — ASSESSMENT & PLAN NOTE
· Elevated upon presentation 27/1 35 baseline creatinine 1 1-1 2 likely secondary poor p  o  intake in the setting of hospital-acquired pneumonia with chronic Wall catheter  · Per EMR Wall catheter exchanged on 12/25/2017 at [facility]  · Initiate IVF normal saline/dextrose 5% at 75 mL/HR  · Monitor ins/outs, daily weights

## 2018-01-14 NOTE — H&P
H&P- Tye Del Rosario 8/18/1930, 80 y o  female MRN: [id number]    Unit/Bed#: ED 28 Encounter: [id number]    Primary Care Provider: Michael Cardenas MD   Date and time admitted to hospital: 1/14/2018  1:35 PM        * Hospital-acquired pneumonia   Assessment & Plan    · Present upon admission 99% on 4 L nasal cannula chest x-ray right lower lobe infiltrate for increase shortness of breath per patient daughter 1 episode of aspiration last Tuesday following dinner denies dysphagia positive contact for flu at nursing facility suspect gram-negative and multi-drug resistant pathogens suspicious for aspiration pneumonia  · Initiate broad-spectrum antibiotic therapy with cefepime, vancomycin, metronidazole  Initiate Tamiflu given positive contacts and symptomology occurring within 72 hours    · Follow sputum culture, blood culture, strep/Legionella urine, flu PCR, MRSA culture  · Continue supportive treatment, Mucinex, Robitussin, flutter valve/airway clearance protocol, oral care, incentive spirometer  · Obtain speech therapy evaluation for possible aspiration NPO sips with meds        Acute respiratory failure with hypoxia ([facility] [address] )   Assessment & Plan    · Present upon admission is oxygen saturation at 99% on 4 L nasal cannula likely secondary to hospital-acquired pneumonia possible aspiration pneumonia suspicious for influenza infection does not use home oxygen  · Continue supportive treatment-Mucinex, Robitussin, flutter valve, airway clearance protocol, oral care, respiratory protocol  · Follow sputum culture, blood culture, strep/Legionella, flu PCR        Acute renal failure with acute renal cortical necrosis superimposed on stage 3 chronic kidney disease (HCC)   Assessment & Plan    · Elevated upon presentation 27/1 35 baseline creatinine 1 1-1 2 likely secondary poor p  o  intake in the setting of hospital-acquired pneumonia with chronic Wall catheter  · Per EMR Wall catheter exchanged on 12/25/2017 at [facility]  · Initiate IVF normal saline/dextrose 5% at 75 mL/HR  · Monitor ins/outs, daily weights        Iron deficiency anemia   Assessment & Plan    · No active signs of bleeding stable H&H 8 9/30 7 baseline 10-11 history PUD  · Obtain iron panel, monitor CBC  · continue home medication-iron supplementation        Hypernatremia   Assessment & Plan    · Elevated 146 likely secondary to poor p o  intake in the setting of hospital-acquired versus aspiration pneumonia  · Initiate dextrose 5%/normal saline at 75 mL/HR  · Follow BMP in a m , encourage p  o  intake, speech therapy pending        History of pulmonary embolus (PE)   Assessment & Plan    · Shortness of breath currently on 4 L nasal cannula oxygen saturation at 98%  · Continue home medication-Eliquis        Hypertension   Assessment & Plan    · Stable BP  · Continue home medication-imdur        Malignant neoplasm of female breast ([facility] [address] )   Assessment & Plan    · Stable status post right mastectomy in 2014  · Continue home medication-tamoxifen        Gastroesophageal reflux disease without esophagitis   Assessment & Plan    · Stable history of PUD  · Continue home medication-pantoprazole        Osteoarthritis of multiple joints   Assessment & Plan    · Poor ambulation and decreased ADL  · Continue home medication-prednisone  · PT/OT               VTE Prophylaxis: Apixaban (Eliquis)  / sequential compression device   Code Status:  Full code-discussed the patient and patient's daughter POA via phone  POLST: POLST information provided but not completed  Discussion with family:  Discussed the patient and patient's daughter via phone chair RN in regards to admission and IV antibiotic therapy for pneumonia along with speech therapy evaluation for possible aspiration pneumonia  Anticipated Length of Stay:  Patient will be admitted on an Inpatient basis with an anticipated length of stay of  at least 2 midnights     Justification for Hospital Stay: Hospital-acquired pneumonia suspect aspiration pneumonia along with acute respiratory failure with hypoxia    Total Time for Visit, including Counseling / Coordination of Care: 1 hour  Greater than 50% of this total time spent on direct patient counseling and coordination of care  Chief Complaint:   "I do not feel right "    History of Present Illness:    January Willis is a 80 y o  female past medical history of CAD status post CABG, hypertension, hyperlipidemia, CKD stage 3, GERD, chronic anemia secondary to iron deficiency anemia, history of PE/DVT on Eliquis, urinary retention with chronic Wall catheter who presents with shortness of breath and fatigue with change in mentation X 6 days  Patient is somewhat of a poor historian given change in mentation was alert oriented to person and place but not time and the following sequence of events were obtained by a EMR, medical records and patient daughter via phone few related following recent hospitalization at [facility] meal number on 12/20 05/12/2020 09/2017 for right-sided facial droop which was likely secondary to acute metabolic encephalopathy and acute superimposed on chronic kidney disease stage 3 patient was discharged to [facility] for further physical therapy  Patient daughter noted last Sunday patient had oxygen placed after nursing reported occasional hypoxia of unknown severity  Patient daughter visited patient on Tuesday and noticed increased chest congestion and 1 episode of aspiration with emesis  Patient declined in lethargy and mentation with repeat CBC/BMP which noted sodium 148 and hemoglobin at 8 8  Patient oxygen increased from 2 L to 4 L and was brought to the emergency department from [facility] via EMS  Patient daughter denies history of dysphagia, chest pain, complains of abdominal pain, hematochezia, melena, hemoptysis or hematemesis    Patient daughter also noted sick contacts with positive flu residents at [facility] nursing facility  In the ED, patient was found to be hypoxic requiring 4 L nasal cannula oxygen saturation at 99% chest x-ray preliminary read revealed a right base consolidation suspicious for pneumonia  Lactic acid was negative elevation in BUN/creatinine at 27/1 35 and hypernatremia 146  Patient was initiated on broad-spectrum antibiotic therapy with cefepime and vancomycin/metronidazole for possible aspiration pneumonia and admitted to medical-surgical floor for further evaluation of acute respiratory failure with hypoxia secondary to hospital-acquired pneumonia possible aspiration suspicious for influenza infection  Review of Systems:    Review of Systems   Constitutional: Positive for activity change, appetite change and fatigue  Negative for chills, diaphoresis, fever and unexpected weight change  HENT: Positive for congestion  Negative for dental problem, drooling, ear pain, facial swelling, postnasal drip, rhinorrhea, sinus pain, sore throat, tinnitus and trouble swallowing  Eyes: Negative for photophobia and visual disturbance  Respiratory: Positive for cough, choking, shortness of breath and wheezing  Negative for apnea, chest tightness and stridor  Cardiovascular: Negative for chest pain, palpitations and leg swelling  Gastrointestinal: Positive for vomiting  Negative for abdominal distention, abdominal pain, blood in stool, constipation, diarrhea, nausea and rectal pain  Endocrine: Negative for polydipsia and polyuria  Genitourinary: Negative for decreased urine volume, difficulty urinating, dyspareunia, hematuria and urgency  Musculoskeletal: Negative for arthralgias, gait problem and myalgias  Skin: Negative for pallor and rash  Neurological: Positive for weakness  Negative for dizziness, tremors, speech difficulty, light-headedness, numbness and headaches  Past Medical and Surgical History:     History reviewed   No pertinent past medical history  History reviewed  No pertinent surgical history  Meds/Allergies:    Prior to Admission medications    Medication Sig Start Date End Date Taking? Authorizing Provider   Amino Acids (ARGIMENT) PACK Take 1 each by mouth daily   Yes Historical Provider, MD   apixaban (ELIQUIS) 2 5 mg Take 2 5 mg by mouth 2 (two) times a day   Yes Historical Provider, MD   guaiFENesin (MUCINEX) 600 mg 12 hr tablet Take 1,200 mg by mouth every 12 (twelve) hours   Yes Historical Provider, MD   iron polysaccharides (NIFEREX) 150 mg capsule Take 150 mg by mouth 2 (two) times a day   Yes Historical Provider, MD   nystatin (MYCOSTATIN) powder Apply 1 application topically 3 (three) times a day   Yes Historical Provider, MD   saccharomyces boulardii (FLORASTOR) 250 mg capsule Take 250 mg by mouth 2 (two) times a day   Yes Historical Provider, MD   allopurinol (ZYLOPRIM) 300 mg tablet Take 300 mg by mouth    Historical Provider, MD   aspirin (ECOTRIN LOW STRENGTH) 81 mg EC tablet Take 81 mg by mouth    Historical Provider, MD   furosemide (LASIX) 20 mg tablet Take 20 mg by mouth daily    Historical Provider, MD   isosorbide mononitrate (IMDUR) 30 mg 24 hr tablet Take 30 mg by mouth daily    Historical Provider, MD   pantoprazole (PROTONIX) 40 mg tablet Take 40 mg by mouth daily    Historical Provider, MD   predniSONE 10 mg tablet Take 10 mg by mouth daily    Historical Provider, MD   tamoxifen (NOLVADEX) 10 mg tablet Take 10 mg by mouth daily    Historical Provider, MD     I have reveiwed home medications using records provided by [facility]  Allergies: Allergies   Allergen Reactions    Penicillins        Social History:     Marital Status:     Occupation:  Retired  Patient Pre-hospital Living Situation:  [employer] per patient's daughter would like to return home with 24 hour nursing care  Patient Pre-hospital Level of Mobility:  Wheelchair-bound  Patient Pre-hospital Diet Restrictions:  Regular diet with thin liquids  Substance Use History:   History   Alcohol use Not on file     History   Smoking Status    Not on file   Smokeless Tobacco    Not on file     History   Drug use: Unknown       Family History:    History reviewed  No pertinent family history  Physical Exam:     Vitals:   Blood Pressure: 162/71 (01/14/18 1700)  Pulse: 66 (01/14/18 1700)  Temperature: 98 1 °F (36 7 °C) (01/14/18 1350)  Temp Source: Oral (01/14/18 1350)  Respirations: 16 (01/14/18 1700)  Weight - Scale: 74 6 kg (164 lb 8 oz) (01/14/18 1350)  SpO2: 93 % (01/14/18 1700)    Physical Exam   Constitutional:   Frail, alert and oriented to person and place but not time able to follow some easy commands   HENT:   Head: Normocephalic and atraumatic  Mouth/Throat: No oropharyngeal exudate  Dry mucous membranes   Eyes: Conjunctivae and EOM are normal  Pupils are equal, round, and reactive to light  Right eye exhibits no discharge  Left eye exhibits no discharge  No scleral icterus  Neck: Normal range of motion  Neck supple  No JVD present  No tracheal deviation present  No thyromegaly present  Cardiovascular: Normal rate  Exam reveals no gallop and no friction rub  Murmur heard  Pulmonary/Chest: She is in respiratory distress  She has wheezes  She has no rales  She exhibits no tenderness  Increased work of breathing while speaking with accessory muscle use, course rhonchi and bronchial sounds bilateral   Abdominal: Soft  Bowel sounds are normal  She exhibits no distension and no mass  There is no tenderness  There is no rebound and no guarding  Wall catheter present draining clear yellow urine   Musculoskeletal: Normal range of motion  She exhibits edema  She exhibits no tenderness or deformity  Lymphadenopathy:     She has no cervical adenopathy  Neurological: She is alert  She displays normal reflexes  No cranial nerve deficit  She exhibits normal muscle tone  Coordination abnormal    Skin: Skin is warm and dry  No rash noted  No erythema  No pallor  Psychiatric: Her behavior is normal      Additional Data:     Lab Results: I have personally reviewed pertinent reports  Results from last 7 days  Lab Units 01/14/18  1450   WBC Thousand/uL 4 76   HEMOGLOBIN g/dL 8 9*   HEMATOCRIT % 30 7*   PLATELETS Thousands/uL 190   NEUTROS PCT % 81*   LYMPHS PCT % 9*   MONOS PCT % 8   EOS PCT % 1       Results from last 7 days  Lab Units 01/14/18  1447   SODIUM mmol/L 146*   POTASSIUM mmol/L 3 7   CHLORIDE mmol/L 109*   CO2 mmol/L 33*   BUN mg/dL 27*   CREATININE mg/dL 1 35*   CALCIUM mg/dL 9 4   TOTAL PROTEIN g/dL 5 4*   BILIRUBIN TOTAL mg/dL 0 50   ALK PHOS U/L 42*   ALT U/L 19   AST U/L 19   GLUCOSE RANDOM mg/dL 81       Results from last 7 days  Lab Units 01/14/18  1448   INR  1 32*       Imaging: I have personally reviewed pertinent reports  XR chest 2 views   ED Interpretation by Keri Hernandez PA-C (01/14 2473)   Pneumonia right base          EKG, Pathology, and Other Studies Reviewed on Admission:   · EKG:  Junctional rhythm without ST elevation or depression HR 67  · Chest x-ray preliminary read consolidation of right base    Allscripts / Epic Records Reviewed: Yes     ** Please Note: This note has been constructed using a voice recognition system   **

## 2018-01-14 NOTE — ASSESSMENT & PLAN NOTE
· Elevated 146 likely secondary to poor p o  intake in the setting of hospital-acquired versus aspiration pneumonia  · Initiate dextrose 5%/normal saline at 75 mL/HR  · Follow BMP in a m , encourage p  o  intake, speech therapy pending

## 2018-01-14 NOTE — ASSESSMENT & PLAN NOTE
· Present upon admission 99% on 4 L nasal cannula chest x-ray right lower lobe infiltrate for increase shortness of breath per patient daughter 1 episode of aspiration last Tuesday following dinner denies dysphagia positive contact for flu at nursing facility suspect gram-negative and multi-drug resistant pathogens suspicious for aspiration pneumonia  · Initiate broad-spectrum antibiotic therapy with cefepime, vancomycin, metronidazole  Initiate Tamiflu given positive contacts and symptomology occurring within 72 hours    · Follow sputum culture, blood culture, strep/Legionella urine, flu PCR, MRSA culture  · Continue supportive treatment, Mucinex, Robitussin, flutter valve/airway clearance protocol, oral care, incentive spirometer  · Obtain speech therapy evaluation for possible aspiration NPO sips with meds

## 2018-01-14 NOTE — ASSESSMENT & PLAN NOTE
· No active signs of bleeding stable H&H 8 9/30 7 baseline 10-11 history PUD  · Obtain iron panel, monitor CBC  · continue home medication-iron supplementation

## 2018-01-14 NOTE — ASSESSMENT & PLAN NOTE
· Present upon admission is oxygen saturation at 99% on 4 L nasal cannula likely secondary to hospital-acquired pneumonia possible aspiration pneumonia suspicious for influenza infection does not use home oxygen  · Continue supportive treatment-Mucinex, Robitussin, flutter valve, airway clearance protocol, oral care, respiratory protocol  · Follow sputum culture, blood culture, strep/Legionella, flu PCR

## 2018-01-15 PROBLEM — I96 NECROSIS OF TOE (HCC): Chronic | Status: ACTIVE | Noted: 2018-01-01

## 2018-01-15 NOTE — ASSESSMENT & PLAN NOTE
· Assessment: Stable at 146 today    · Plan: Follow up with speech therapy evaluation  If still recommending NPO status will restart 1/2 NSS with dextrose   Check BMP in AM

## 2018-01-15 NOTE — ASSESSMENT & PLAN NOTE
· Assessment: Creatinine has normalized to 1 30 today with IVF  · Plan: Discontinue further fluids   If speech therapy feels as though patient should be NPO further will consider restarting fluids

## 2018-01-15 NOTE — ASSESSMENT & PLAN NOTE
· Assessment: Secondary to primary problem - HCAP    · Plan: Continue plan as per primary   Supplementary oxygen

## 2018-01-15 NOTE — RESPIRATORY THERAPY NOTE
RT Protocol Note  aJnuary Willis 80 y o  female MRN: [id number]  Unit/Bed#: -01 Encounter: [id number]    Assessment    Principal Problem:    Hospital-acquired pneumonia  Active Problems:    Acute renal failure with acute renal cortical necrosis superimposed on stage 3 chronic kidney disease (HCC)    Gastroesophageal reflux disease without esophagitis    Hypernatremia    Hypertension    Malignant neoplasm of female breast (HCC)    Osteoarthritis of multiple joints    Acute respiratory failure with hypoxia (HCC)    Iron deficiency anemia    History of pulmonary embolus (PE)      Home Pulmonary Medications:  None    History reviewed  No pertinent past medical history  Social History     Social History    Marital status:      Spouse name: N/A    Number of children: N/A    Years of education: N/A     Social History Main Topics    Smoking status: None    Smokeless tobacco: None    Alcohol use None    Drug use: Unknown    Sexual activity: Not Asked     Other Topics Concern    None     Social History Narrative    None       Subjective         Objective    Physical Exam:   Assessment Type: Assess only  General Appearance: Awake, Other (Comment) (confused)  Respiratory Pattern: Normal  Chest Assessment: Chest expansion symmetrical  Bilateral Breath Sounds: Diminished  Cough: Non-productive, Weak  O2 Device: 3L NC    Vitals:  Blood pressure 162/72, pulse 86, temperature 98 3 °F (36 8 °C), temperature source Oral, resp  rate 16, height 5' (1 524 m), weight 74 6 kg (164 lb 8 oz), SpO2 97 %  Imaging and other studies: I have personally reviewed pertinent reports  O2 Device: 3L NC     Plan    Respiratory Plan: No distress/Pulmonary history  Airway Clearance Plan: Flutter     Resp Comments: According to Pt's daughter, pt has hx of asthma, but no home regimen  Attempted to perform flutter valve with pt however pt has poor effort to do at this time  Pt does not appear in respiratory distress  Will order Q6PRN 2 5mg Albuterol for SOB and wheezing

## 2018-01-15 NOTE — ASSESSMENT & PLAN NOTE
· Assessment: BP controlled at this time       · Plan: Continue home medical management - Lasix 20 mg QD

## 2018-01-15 NOTE — PHYSICAL THERAPY NOTE
Physical Therapy Cancellation Note    PT orders received  Per nsg, pt c increased agitation yelling out  Not appropriate for PT at this time  Will cont to follow as appropriate       Chester Patel, PT

## 2018-01-15 NOTE — PLAN OF CARE
Problem: SLP ADULT - SWALLOWING, IMPAIRED  Goal: Initial SLP swallow eval performed  Outcome: Completed Date Met: 01/15/18

## 2018-01-15 NOTE — ASSESSMENT & PLAN NOTE
· Assessment: POA, improving given patient able to sat appropriately at 91% on 3 LPM  Afebrile, no leukocytosis  Flu/RSV PCR negative  Blood cultures pending  Antigens negative  Sputum culture pending  Speech eval pending  · Plan: Continue current antibiotic - Cefepime, Vanco, Flagyl  Will discontinue Vanco given negative Flu testing making this unlikely post-viral pneumonia, therefore MRSA less of concern  May be able to discontinue Flagyl if patient passes speech assessment  Continue supplementary oxygen as needed to keep sats >88%  Follow up with culture results   Monitor temps, CBC, BMP

## 2018-01-15 NOTE — PROGRESS NOTES
[facility]  Progress Note - Afia Kumari 8/18/1930, 80 y o  female MRN: [id number]    Unit/Bed#: -01 Encounter: [id number]    Primary Care Provider: Cielo Issa MD   Date and time admitted to hospital: 1/14/2018  1:35 PM        * Hospital-acquired pneumonia   Assessment & Plan    · Assessment: POA, improving given patient able to sat appropriately at 91% on 3 LPM  Afebrile, no leukocytosis  Flu/RSV PCR negative  Blood cultures pending  Antigens negative  Sputum culture pending  Speech eval pending  · Plan: Continue current antibiotic - Cefepime, Vanco, Flagyl  Will discontinue Vanco given negative Flu testing making this unlikely post-viral pneumonia, therefore MRSA less of concern  May be able to discontinue Flagyl if patient passes speech assessment  Continue supplementary oxygen as needed to keep sats >88%  Follow up with culture results  Monitor temps, CBC, BMP         Acute respiratory failure with hypoxia (HCC)   Assessment & Plan    · Assessment: Secondary to primary problem - HCAP    · Plan: Continue plan as per primary  Supplementary oxygen        Acute renal failure with acute renal cortical necrosis superimposed on stage 3 chronic kidney disease ([facility] [address] )   Assessment & Plan    · Assessment: Creatinine has normalized to 1 30 today with IVF  · Plan: Discontinue further fluids  If speech therapy feels as though patient should be NPO further will consider restarting fluids        Hypernatremia   Assessment & Plan    · Assessment: Stable at 146 today    · Plan: Follow up with speech therapy evaluation  If still recommending NPO status will restart 1/2 NSS with dextrose   Check BMP in AM         Iron deficiency anemia   Assessment & Plan    · Assessment: POA, hgb stable at 8 5 today    · Plan: Continue current management        Malignant neoplasm of female breast [facility])   Assessment & Plan    · Assessment: Stable, status post mastectomy    · Plan: Continue Tamoxifen Hypertension   Assessment & Plan    · Assessment: BP controlled at this time  · Plan: Continue home medical management - Lasix 20 mg QD        Necrosis of toe (HCC)   Assessment & Plan    Assessment: Noted on left great toe, present on admission  Discussed with family at bedside, this had been evaluated prior at [facility] with no planned intervention  Suspect vascular source given DP pulses are absent in left foot and thready at best in right foot  Plan: Serial examinations  Monitor for signs of infection            VTE Pharmacologic Prophylaxis:   Pharmacologic: Apixaban (Eliquis)  Mechanical VTE Prophylaxis in Place: No    Patient Centered Rounds: I have performed bedside rounds with nursing staff today  Discussions with Specialists or Other Care Team Provider: Discussed with RN, CM    Education and Discussions with Family / Patient: Discussed with patient, family at bedside     Time Spent for Care: 30 minutes  More than 50% of total time spent on counseling and coordination of care as described above  Current Length of Stay: 1 day(s)    Current Patient Status: Inpatient   Certification Statement: The patient will continue to require additional inpatient hospital stay due to IV antibiotics    Discharge Plan: Likely 48 hours     Code Status: Level 1 - Full Code      Subjective:   Patient states that she is feeling much better today  States that her breathing has improved and denies SOB, coughing, or wheezing  States that she gets some chest pain with deep inspiration, but this has been on going  Denies fevers or chills  Denies pain in her feet  Objective:     Vitals:   Temp (24hrs), Av 4 °F (36 9 °C), Min:98 1 °F (36 7 °C), Max:99 1 °F (37 3 °C)    HR:  [66-86] 80  Resp:  [14-18] 18  BP: (160-192)/(60-98) 162/60  SpO2:  [91 %-99 %] 91 %  Body mass index is 32 13 kg/m²  Input and Output Summary (last 24 hours):        Intake/Output Summary (Last 24 hours) at 01/15/18 929 [facility] filed at 01/15/18 0545   Gross per 24 hour   Intake             1150 ml   Output             1200 ml   Net              -50 ml       Physical Exam:     Physical Exam   Constitutional: She is oriented to person, place, and time  Vital signs are normal  She appears well-developed and well-nourished  Non-toxic appearance  No distress  Nasal cannula in place  HENT:   Head: Normocephalic and atraumatic  Mouth/Throat: Mucous membranes are dry  Eyes: Conjunctivae and EOM are normal  Pupils are equal, round, and reactive to light  Neck: Neck supple  Cardiovascular: Normal rate, regular rhythm, S1 normal, S2 normal, normal heart sounds and intact distal pulses  Exam reveals no S4  No murmur heard  Pulses:       Posterior tibial pulses are 0 on the right side, and 1+ on the left side  Pulmonary/Chest: Effort normal  No accessory muscle usage  No respiratory distress  She has decreased breath sounds (diminished and coarse throughout)  She has no wheezes  She has no rhonchi  She has rales in the right lower field and the left lower field  She exhibits no tenderness  Abdominal: Soft  Bowel sounds are normal  She exhibits no distension and no mass  There is no tenderness  There is no rigidity, no rebound and no guarding  Neurological: She is alert and oriented to person, place, and time  Skin: Skin is warm and dry              Additional Data:     Labs:      Results from last 7 days  Lab Units 01/15/18  0633 01/15/18  0439   WBC Thousand/uL 4 40 3 77*   HEMOGLOBIN g/dL 8 5* 7 0*   HEMATOCRIT % 29 4* 24 0*   PLATELETS Thousands/uL 167 136*   NEUTROS PCT %  --  80*   LYMPHS PCT %  --  7*   MONOS PCT %  --  9   EOS PCT %  --  3       Results from last 7 days  Lab Units 01/15/18  0633   SODIUM mmol/L 146*  145   POTASSIUM mmol/L 3 3*  3 2*   CHLORIDE mmol/L 111*  110*   CO2 mmol/L 28  28   BUN mg/dL 24  25   CREATININE mg/dL 1 30  1 31*   CALCIUM mg/dL 9 1  8 7   TOTAL PROTEIN g/dL 5 0* BILIRUBIN TOTAL mg/dL 0 50   ALK PHOS U/L 38*   ALT U/L 17   AST U/L 20   GLUCOSE RANDOM mg/dL 98  96       Results from last 7 days  Lab Units 01/14/18  1448   INR  1 32*       * I Have Reviewed All Lab Data Listed Above  * Additional Pertinent Lab Tests Reviewed: [address] [facility] Admission Reviewed    Imaging:    Imaging Reports Reviewed Today Include: CXR - RLL effusion/infiltrate with atelectasis  Imaging Personally Reviewed by Myself Includes:  None    Recent Cultures (last 7 days):       Results from last 7 days  Lab Units 01/14/18  2207 01/14/18  1447   INFLUENZA A PCR   --  None Detected   INFLUENZA B PCR   --  None Detected   RSV PCR   --  None Detected   LEGIONELLA URINARY ANTIGEN  Negative  --        Last 24 Hours Medication List:     apixaban 2 5 mg Oral BID   aspirin 81 mg Oral Daily   cefepime 2,000 mg Intravenous Q24H   docusate sodium 100 mg Oral BID   guaiFENesin 1,200 mg Oral Q12H [address]   iron polysaccharides 150 mg Oral BID   isosorbide mononitrate 30 mg Oral Daily   metroNIDAZOLE 500 mg Intravenous Q8H   nystatin 1 application Topical TID   oseltamivir 30 mg Oral Q24H   pantoprazole 40 mg Oral Daily   predniSONE 10 mg Oral Daily   saccharomyces boulardii 250 mg Oral BID   tamoxifen 10 mg Oral Daily   vancomycin 12 5 mg/kg (Adjusted) Intravenous Q24H        Today, Patient Was Seen By: Shelby Escobar PA-C    ** Please Note: Dictation voice to text software may have been used in the creation of this document   **

## 2018-01-15 NOTE — PLAN OF CARE
Problem: Potential for Falls  Goal: Patient will remain free of falls  INTERVENTIONS:  - Assess patient frequently for physical needs  -  Identify cognitive and physical deficits and behaviors that affect risk of falls    -  [place] fall precautions as indicated by assessment   - Educate patient/family on patient safety including physical limitations  - Instruct patient to call for assistance with activity based on assessment  - Modify environment to reduce risk of injury  - Consider OT/PT consult to assist with strengthening/mobility   Outcome: Progressing      Problem: Prexisting or High Potential for Compromised Skin Integrity  Goal: Skin integrity is maintained or improved  INTERVENTIONS:  - Identify patients at risk for skin breakdown  - Assess and monitor skin integrity  - Assess and monitor nutrition and hydration status  - Monitor labs (i e  albumin)  - Assess for incontinence   - Turn and reposition patient  - Assist with mobility/ambulation  - Relieve pressure over bony prominences  - Avoid friction and shearing  - Provide appropriate hygiene as needed including keeping skin clean and dry  - Evaluate need for skin moisturizer/barrier cream  - Collaborate with interdisciplinary team (i e  Nutrition, Rehabilitation, etc )   - Patient/family teaching   Outcome: Progressing

## 2018-01-15 NOTE — CASE MANAGEMENT
Initial Clinical Review    Admission: Date/Time/Statement: 1/14/18 @ 1604     Orders Placed This Encounter   Procedures    Inpatient Admission (expected length of stay for this patient is greater than two midnights)     Standing Status:   Standing     Number of Occurrences:   1     Order Specific Question:   Admitting Physician     Answer:   Holly Cruz     Order Specific Question:   Level of Care     Answer:   Med Surg [16]     Order Specific Question:   Estimated length of stay     Answer:   More than 2 Midnights     Order Specific Question:   Certification     Answer:   I certify that inpatient services are medically necessary for this patient for a duration of greater than two midnights  See H&P and MD Progress Notes for additional information about the patient's course of treatment  ED: Date/Time/Mode of Arrival:   ED Arrival Information     Expected Arrival Acuity Means of Arrival Escorted By Service Admission Type    - 1/14/2018 13:34 Emergent Ambulance [address] Complaint    CONGESTION          Chief Complaint:   Chief Complaint   Patient presents with    Shortness of Breath     [facility] at [facility] for [facility]  daughter states pt has been on O2 since then but unsure why  pt with decreased mentation and SOB today       History of Illness: 80 y o  female past medical history of CAD status post CABG, hypertension, hyperlipidemia, CKD stage 3, GERD, chronic anemia secondary to iron deficiency anemia, history of PE/DVT on Eliquis, urinary retention with chronic Wall catheter who presents with shortness of breath and fatigue with change in mentation X 6 days    Patient is somewhat of a poor historian given change in mentation was alert oriented to person and place but not time and the following sequence of events were obtained by a EMR, medical records and patient daughter via phone few related following recent hospitalization at [facility] meal number on 12/20 05/12/2020 09/2017 for right-sided facial droop which was likely secondary to acute metabolic encephalopathy and acute superimposed on chronic kidney disease stage 3 patient was discharged to [facility] for further physical therapy  Patient daughter noted last Sunday patient had oxygen placed after nursing reported occasional hypoxia of unknown severity  Patient daughter visited patient on Tuesday and noticed increased chest congestion and 1 episode of aspiration with emesis  Patient declined in lethargy and mentation with repeat CBC/BMP which noted sodium 148 and hemoglobin at 8 8  Patient oxygen increased from 2 L to 4 L and was brought to the emergency department from [facility] via EMS  Patient daughter denies history of dysphagia, chest pain, complains of abdominal pain, hematochezia, melena, hemoptysis or hematemesis  Patient daughter also noted sick contacts with positive flu residents at [facility]      In the ED, patient was found to be hypoxic requiring 4 L nasal cannula oxygen saturation at 99% chest x-ray preliminary read revealed a right base consolidation suspicious for pneumonia  Lactic acid was negative elevation in BUN/creatinine at 27/1 35 and hypernatremia 146  Patient was initiated on broad-spectrum antibiotic therapy with cefepime and vancomycin/metronidazole for possible aspiration pneumonia and admitted to medical-surgical floor for further evaluation of acute respiratory failure with hypoxia secondary to hospital-acquired pneumonia possible aspiration suspicious for influenza infection      ED Vital Signs:   ED Triage Vitals   Temperature Pulse Respirations Blood Pressure SpO2   01/14/18 1350 01/14/18 1350 01/14/18 1350 01/14/18 1350 01/14/18 1350   98 1 °F (36 7 °C) 69 14 161/92 91 %      Temp Source Heart Rate Source Patient Position - Orthostatic VS BP Location FiO2 (%)   01/14/18 1350 01/14/18 1350 01/14/18 1500 01/14/18 1500 --   Oral Monitor Lying Right arm       Pain Score       01/14/18 1350       No Pain        Wt Readings from Last 1 Encounters:   01/14/18 74 6 kg (164 lb 8 oz)       Vital Signs (abnormal):  Maximum /84    Abnormal Labs/Diagnostic Test Results:   INR 1 32  PTT 60  TSH 3rd generation 4 247  Na 146  Cl 109  CO2-33  Bun 27  Creatinine 1 35  Alkaline phosphatase 42  Total protein 5 4  Albumin 2 7  Wbc 3 12, hgb 8 9, hct 30 7    CxR - New small right-sided pleural effusion with right basilar atelectasis or infiltrate    EKG - Accelerated Junctional rhythm  Nonspecific T wave abnormality  Abnormal ECG  When compared with ECG of 17-FEB-2014 13:17,  Junctional rhythm has replaced Sinus rhythm  Questionable change in QRS axis    ED Treatment: blood cultures  Oxygen 4 liters  Medication Administration from 01/14/2018 1333 to 01/14/2018 1828       Date/Time Order Dose Route Action Action by Comments     01/14/2018 1702 sodium chloride 0 9 % bolus 1,000 mL 0 mL Intravenous Stopped Edison Bailey RN      01/14/2018 1459 sodium chloride 0 9 % bolus 1,000 mL 1,000 mL Intravenous [address] Edison Bailey RN      01/14/2018 1702 cefepime (MAXIPIME) 2 g/50 mL dextrose IVPB 0 mg Intravenous Stopped Edison Bailey RN      01/14/2018 1559 cefepime (MAXIPIME) 2 g/50 mL dextrose IVPB 2,000 mg Intravenous New [facility] Edison Bailey RN      01/14/2018 1812 metroNIDAZOLE (FLAGYL) IVPB (premix) 500 mg 0 mg Intravenous Stopped Edison Bailey RN      01/14/2018 1711 metroNIDAZOLE (FLAGYL) IVPB (premix) 500 mg 500 mg Intravenous New Bag Edison Bailey RN           Past Medical/Surgical History:    Active Ambulatory Problems     Diagnosis Date Noted    Chronic diastolic heart failure ([facility] [address] ) 10/07/2011     Resolved Ambulatory Problems     Diagnosis Date Noted    No Resolved Ambulatory Problems     No Additional Past Medical History Admitting Diagnosis: Dehydration [E86 0]  URI (upper respiratory infection) [J06 9]  Chronic renal insufficiency [N18 9]  Hospital-acquired pneumonia [J18 9]    Age/Sex: 80 y o  female    Assessment/Plan:   Hospital-acquired pneumonia   Assessment & Plan     · Present upon admission 99% on 4 L nasal cannula chest x-ray right lower lobe infiltrate for increase shortness of breath per patient daughter 1 episode of aspiration last Tuesday following dinner denies dysphagia positive contact for flu at nursing facility suspect gram-negative and multi-drug resistant pathogens suspicious for aspiration pneumonia  ? Initiate broad-spectrum antibiotic therapy with cefepime, vancomycin, metronidazole  Initiate Tamiflu given positive contacts and symptomology occurring within 72 hours  ? Follow sputum culture, blood culture, strep/Legionella urine, flu PCR, MRSA culture  ? Continue supportive treatment, Mucinex, Robitussin, flutter valve/airway clearance protocol, oral care, incentive spirometer  ? Obtain speech therapy evaluation for possible aspiration NPO sips with meds       Acute respiratory failure with hypoxia (HCC)   Assessment & Plan     · Present upon admission is oxygen saturation at 99% on 4 L nasal cannula likely secondary to hospital-acquired pneumonia possible aspiration pneumonia suspicious for influenza infection does not use home oxygen  ? Continue supportive treatment-Mucinex, Robitussin, flutter valve, airway clearance protocol, oral care, respiratory protocol  ? Follow sputum culture, blood culture, strep/Legionella, flu PCR       Acute renal failure with acute renal cortical necrosis superimposed on stage 3 chronic kidney disease (HCC)   Assessment & Plan     · Elevated upon presentation 27/1 35 baseline creatinine 1 1-1 2 likely secondary poor p  o  intake in the setting of hospital-acquired pneumonia with chronic Wall catheter  ?  Per EMR Wall catheter exchanged on 12/25/2017 [facility]  ? Initiate IVF normal saline/dextrose 5% at 75 mL/HR  ? Monitor ins/outs, daily weights       Iron deficiency anemia   Assessment & Plan     · No active signs of bleeding stable H&H 8 9/30 7 baseline 10-11 history PUD  ? Obtain iron panel, monitor CBC  ? continue home medication-iron supplementation       Hypernatremia   Assessment & Plan     · Elevated 146 likely secondary to poor p o  intake in the setting of hospital-acquired versus aspiration pneumonia  ? Initiate dextrose 5%/normal saline at 75 mL/HR  ? Follow BMP in a m , encourage p  o  intake, speech therapy pending       History of pulmonary embolus (PE)   Assessment & Plan     · Shortness of breath currently on 4 L nasal cannula oxygen saturation at 98%  ? Continue home medication-Eliquis       Hypertension   Assessment & Plan     · Stable BP  ? Continue home medication-imdur       Malignant neoplasm of female breast [facility])   Assessment & Plan     · Stable status post right mastectomy in 2014  ? Continue home medication-tamoxifen       Gastroesophageal reflux disease without esophagitis   Assessment & Plan     · Stable history of PUD  ? Continue home medication-pantoprazole       Osteoarthritis of multiple joints   Assessment & Plan     · Poor ambulation and decreased ADL  ? Continue home medication-prednisone  ?  PT/OT         Admission Orders:  1/14/2018  1604   Scheduled Meds:   apixaban 2 5 mg Oral BID   aspirin 81 mg Oral Daily   cefepime 2,000 mg Intravenous Q24H   docusate sodium 100 mg Oral BID   guaiFENesin 1,200 mg Oral Q12H [address]   iron polysaccharides 150 mg Oral BID   isosorbide mononitrate 30 mg Oral Daily   metroNIDAZOLE 500 mg Intravenous Q8H   nystatin 1 application Topical TID   oseltamivir 30 mg Oral Q24H   pantoprazole 40 mg Oral Daily   predniSONE 10 mg Oral Daily   saccharomyces boulardii 250 mg Oral BID   tamoxifen 10 mg Oral Daily   vancomycin 12 5 mg/kg (Adjusted) Intravenous Q24H     Continuous Infusions:    PRN Meds: not used:    acetaminophen    albuterol    calcium carbonate    dextromethorphan-guaiFENesin    ondansetron    OTHER ORDERS:  scds  Continuous cardiac monitoring  Sputum culture  Oscillatory positive expiratory pressure tid   Respiratory protocol   Airway clearance protocol  PT/OT/speech

## 2018-01-15 NOTE — ASSESSMENT & PLAN NOTE
Assessment: Noted on left great toe, present on admission  Discussed with family at bedside, this had been evaluated prior at [facility] with no planned intervention  Suspect vascular source given DP pulses are absent in left foot and thready at best in right foot  Plan: Serial examinations   Monitor for signs of infection

## 2018-01-15 NOTE — SPEECH THERAPY NOTE
Speech Language/Pathology  Speech/Language Pathology Dysphagia Assessment    Patient Name: Jazmín BINGHAM Date: 1/15/2018     Problem List  Patient Active Problem List   Diagnosis    Chronic diastolic heart failure ([facility] [address] )    Acute renal failure with acute renal cortical necrosis superimposed on stage 3 chronic kidney disease (HCC)    Gastroesophageal reflux disease without esophagitis    Hypernatremia    Hypertension    Malignant neoplasm of female breast ([facility] [address] )    Osteoarthritis of multiple joints    Hospital-acquired pneumonia    Acute respiratory failure with hypoxia (HCC)    Iron deficiency anemia    History of pulmonary embolus (PE)    Necrosis of toe ([facility] [address] )     Past Medical History  Please refer to chart for full PMH  Past Surgical History  History reviewed  No pertinent surgical history  Reason for consult:  R/o aspiration  Determine safest and least restrictive diet  Change in mental status  Respiratory compromise  current pna    Precautions:  Droplet  Current diet:  NPO  Premorbid diet[de-identified]  Regular   Previous VBS:  No  O2 requirement:  Yes  Voice/Speech:  functional  Social:  Son was present  Follows commands:  Inconsistently                        Cognitive Status:  Impaired, per son she was much better than currently  Oral [facility] exam:  Partial dentition  Poor condition  Full symmetry       Dry brown coated tongue     Items administered:  Puree, soft solid, hard solid, honey thick liquid, nectar thick liquid, thin liquids,  Liquids were taken by straw       Oral stage:  Lip closure: adequate  Mastication: not assessed, no solids given  Bolus formation: reduced  Bolus control:  Reduced m, suspect premature spillage of liquids  Transfer: slow  Oral residue: minimal for consistencies tested  Pocketing: no    Pharyngeal stage:  Swallow promptness: delayed  Laryngeal rise: appears weak  Wet voice: intermittent  Throat clear: yes  Cough: yes, coughing noted on thin liquids, throat clearing with nectar thick  No throat clearing or cough noted with honey thick liquids or applesauce  Secondary swallows: present for consistencies tested, 2-3 multiple swallows  Audible swallows: yes   s/s aspiration with thin liquids    Esophageal stage:    H/o GERD  H/o EGD & stricture w/ dilation - per son's report, many years ago    Summary:  Pt presents w/ moderate oral / pharyngeal dysphagia with s/s of aspiration with thin liquids  Recommendations:  Diet: pureed  Liquid: honey thick  Meds: crush / applesauce  Positioning:Upright  Strategies: Monitor for multiple swallows, bolus clearance  Aspiration precautions  Reflux precautions    Aspiration precautions posted    Results d/w:  Pt, family, nurse    Goal(s):    Pt will tolerate least restrictive diet w/out s/s aspiration or oral/pharyngeal difficulties  Consider a VBS if symptoms persist over time

## 2018-01-16 PROBLEM — G93.40 ACUTE ENCEPHALOPATHY: Status: ACTIVE | Noted: 2018-01-01

## 2018-01-16 NOTE — PROGRESS NOTES
Attempted to give patient PO medications including Eliquis  Patient combative, confused and spitting applesauce containing medication out  Unsure of how much medication patient actually ingested  Explained to patient the importance of taking mediations and attempted to reorient patient  MELBA Jama aware

## 2018-01-16 NOTE — ASSESSMENT & PLAN NOTE
· Assessment: Secondary to primary problem - HCAP  Worsened overnight likely due to encephalopathy and distress    · Plan: Continue plan as per primary  Supplementary oxygen  Will try to keep patient as calm as possible

## 2018-01-16 NOTE — SOCIAL WORK
LOS 2 days  Pt is a bundle  Not a SL readmission but was at [facility] last week  S/w pt's daughter Ian Nielsen via telephone as pt is very confused  Pt lives in her home with live in caregivers  Her home is fully handicap accessible  She has a walker and w/c  She is completely dependent for ADLs  She has had [facility] and [facility] in the past and is currently open with [facility]  Pt has been to [facility] in the past for STR  Pt's daughter was not happy with the care at [facility] this time around and does not want her to go back  She states she would be open to [address] [place] [address] and [facility] if pt were to need continued inpt rehab  Daughter feels that she would do better in her home environment and would like [facility]  She uses [facility] in [place], has Rx insurance and no difficulty affording meds  She has no hx of mental illness or D&A abuse  She has no POA but her daughter is her next of kin and an only child  The pt is retired and her daughter and son in law transport her when needed  Referrals were placed to [address] [place] [address] Street and [facility] for STR and [facility] for home care  Cm will follow for dispo

## 2018-01-16 NOTE — PLAN OF CARE
Problem: DISCHARGE PLANNING - CARE MANAGEMENT  Goal: Discharge to post-acute care or home with appropriate resources  INTERVENTIONS:  - Conduct assessment to determine patient/family and health care team treatment goals, and need for post-acute services based on payer coverage, community resources, and patient preferences, and barriers to discharge  - Address psychosocial, clinical, and financial barriers to discharge as identified in assessment in conjunction with the patient/family and health care team  - Arrange appropriate level of post-acute services according to patients   needs and preference and payer coverage in collaboration with the physician and health care team  - Communicate with and update the patient/family, physician, and health care team regarding progress on the discharge plan  - Arrange appropriate transportation to post-acute venues  Outcome: Progressing  LOS 2 days  Pt is a bundle  Not a  readmission but was at [facility] last week  S/w pt's daughter Vesna Cali via telephone as pt is very confused  Pt lives in her home with live in caregivers  Her home is fully handicap accessible  She has a walker and w/c  She is completely dependent for ADLs  She has had  VNA and [facility] in the past and is currently open with [facility]  Pt has been to [facility] in the past for [facility]  Pt's daughter was not happy with the care at [facility] this time around and does not want her to go back  She states she would be open to [address] and [facility] if pt were to need continued inpt rehab  Daughter feels that she would do better in her home environment and would like [facility]  She uses [facility] in [place], has Rx insurance and no difficulty affording meds  She has no hx of mental illness or D&A abuse  She has no POA but her daughter is her next of kin and an only child  The pt is retired and her daughter and son in law transport her when needed      Referrals were placed to [address] and [facility] for [facility] and [facility] for home care  Cm will follow for dispo

## 2018-01-16 NOTE — PLAN OF CARE
Problem: Potential for Falls  Goal: Patient will remain free of falls  INTERVENTIONS:  - Assess patient frequently for physical needs  -  Identify cognitive and physical deficits and behaviors that affect risk of falls  -  [place] fall precautions as indicated by assessment   - Educate patient/family on patient safety including physical limitations  - Instruct patient to call for assistance with activity based on assessment  - Modify environment to reduce risk of injury  - Consider OT/PT consult to assist with strengthening/mobility   Outcome: Progressing      Problem: Prexisting or High Potential for Compromised Skin Integrity  Goal: Skin integrity is maintained or improved  INTERVENTIONS:  - Identify patients at risk for skin breakdown  - Assess and monitor skin integrity  - Assess and monitor nutrition and hydration status  - Monitor labs (i e  albumin)  - Assess for incontinence   - Turn and reposition patient  - Assist with mobility/ambulation  - Relieve pressure over bony prominences  - Avoid friction and shearing  - Provide appropriate hygiene as needed including keeping skin clean and dry  - Evaluate need for skin moisturizer/barrier cream  - Collaborate with interdisciplinary team (i e  Nutrition, Rehabilitation, etc )   - Patient/family teaching   Outcome: Progressing      Problem: Nutrition/Hydration-ADULT  Goal: Nutrient/Hydration intake appropriate for improving, restoring or maintaining nutritional needs  Monitor and assess patient's nutrition/hydration status for malnutrition (ex- brittle hair, bruises, dry skin, pale skin and conjunctiva, muscle wasting, smooth red tongue, and disorientation)  Collaborate with interdisciplinary team and initiate plan and interventions as ordered  Monitor patient's weight and dietary intake as ordered or per policy  Utilize nutrition screening tool and intervene per policy   Determine patient's food preferences and provide high-protein, high-caloric foods as appropriate       INTERVENTIONS:  - Monitor oral intake, urinary output, labs, and treatment plans  - Assess nutrition and hydration status and recommend course of action  - Evaluate amount of meals eaten  - Assist patient with eating if necessary   - Allow adequate time for meals  - Recommend/ encourage appropriate diets, oral nutritional supplements, and vitamin/mineral supplements  - Order, calculate, and assess calorie counts as needed  - Recommend, monitor, and adjust tube feedings and TPN/PPN based on assessed needs  - Assess need for intravenous fluids  - Provide specific nutrition/hydration education as appropriate  - Include patient/family/caregiver in decisions related to nutrition   Outcome: Not Progressing

## 2018-01-16 NOTE — SPEECH THERAPY NOTE
Speech Language/Pathology    Speech/Language Pathology Progress Note    Patient Name: Elisa BARILLASV Date: 1/16/2018     Attempted to see pt for dysphagia tx  Per nsg notes, pt refusing meals and meds  Pt awake and alert, yelling out  Pt w/ moist respirations, but very dry oral mucosa  Attempted to suction, pt biting down on yankaur; then attempted to give pt tsps of HTL, and coated spoon, pt began spitting any liquids placed on lips or in anterior oral cavity  Will try back as able

## 2018-01-16 NOTE — ASSESSMENT & PLAN NOTE
· Assessment: No BMP for today was able to be collected  Reordered  · Plan: 1/2 NSS have been restarted   Recheck BMP today and tomorrow

## 2018-01-16 NOTE — ASSESSMENT & PLAN NOTE
Assessment: Worsened since admission  Patient was cooperative during exam yesterday, however today she is only alert and oriented to herself  Last night nursing staff was unable to administer medications due to patient spitting them out and cursing at staff  Discussed with patient's daughter who reports that she gets this way with infections and dehydration  She has not been eating or drinking well given modified diet and altered mental status  Urine dip on admission was negative for infection  Plan: Will give 1 liter of 1/2 NSS solution  Check CT head  Continue antibiotics  Haldol PRN if agitated

## 2018-01-16 NOTE — ASSESSMENT & PLAN NOTE
Assessment: Noted on left great toe, present on admission  Previously evaluated  No signs of infection today  Plan: Serial examinations   Monitor for signs of infection

## 2018-01-16 NOTE — PROGRESS NOTES
Pt resting at present time O2 sats 97%, Hr 75, med held for agitation at this time, will cont to monitor

## 2018-01-16 NOTE — PROGRESS NOTES
Pt very agitated and yelling out help, when sats checked 68% so pt placed on O2 mask at that time and resp called to come see pt, after sats imprpved with mask was able to change pt to NC 4L and sats 97% at this time will cont to monitor  SLIM also paged to make aware of agitation and med ordered for same

## 2018-01-16 NOTE — PROGRESS NOTES
[facility]  Progress Note - Milena [facility] 8/18/1930, 80 y o  female MRN: [id number]    Unit/Bed#: -01 Encounter: [id number]    Primary Care Provider: Vonda Lombardi MD   Date and time admitted to hospital: 1/14/2018  1:35 PM        * Hospital-acquired pneumonia   Assessment & Plan    · Assessment: POA, afebrile, no leukocytosis  Patient's mental status has deteriorated further and when patient become distressed her oxygen saturations drop  Noted saturation of 68% needing face mask last night  Now back on nasal cannula  Blood cultures, antigens negative  Flu negative  Speech eval concerning for some aspiration, diet has been adjusted    · Plan: Continue current antibiotic - Cefepime, Flagyl  Continue supplementary oxygen as needed to keep sats >88%  Monitor temps, CBC, BMP         Acute respiratory failure with hypoxia (HCC)   Assessment & Plan    · Assessment: Secondary to primary problem - HCAP  Worsened overnight likely due to encephalopathy and distress    · Plan: Continue plan as per primary  Supplementary oxygen  Will try to keep patient as calm as possible  Acute encephalopathy   Assessment & Plan    Assessment: Worsened since admission  Patient was cooperative during exam yesterday, however today she is only alert and oriented to herself  Last night nursing staff was unable to administer medications due to patient spitting them out and cursing at staff  Discussed with patient's daughter who reports that she gets this way with infections and dehydration  She has not been eating or drinking well given modified diet and altered mental status  Urine dip on admission was negative for infection  Plan: Will give 1 liter of 1/2 NSS solution  Check CT head  Continue antibiotics  Haldol PRN if agitated           Acute renal failure with acute renal cortical necrosis superimposed on stage 3 chronic kidney disease ([facility] [address] )   Assessment & Plan    · Assessment: No BMP for today was able to be collected  Reordered  · Plan: 1/2 NSS have been restarted  Recheck BMP today and tomorrow        Hypernatremia   Assessment & Plan    · Assessment: 146 yesterday, no lab for today    · Plan: restart 1/2 NSS  Check BMP in AM         Iron deficiency anemia   Assessment & Plan    · Assessment: POA, improved to 9 1 today    · Plan: Continue current management        Hypertension   Assessment & Plan    · Assessment: BP controlled at this time  · Plan: Continue home medical management - Lasix 20 mg QD        Malignant neoplasm of female breast ([facility] [address] )   Assessment & Plan    · Assessment: Stable, status post mastectomy    · Plan: Continue Tamoxifen        Necrosis of toe (HCC)   Assessment & Plan    Assessment: Noted on left great toe, present on admission  Previously evaluated  No signs of infection today  Plan: Serial examinations  Monitor for signs of infection            VTE Pharmacologic Prophylaxis:   Pharmacologic: Heparin  Mechanical VTE Prophylaxis in Place: Yes    Patient Centered Rounds: I have performed bedside rounds with nursing staff today  Discussions with Specialists or Other Care Team Provider: Discussed with RN, CM    Education and Discussions with Family / Patient: Discussed with patient, called daughter     Time Spent for Care: 30 minutes  More than 50% of total time spent on counseling and coordination of care as described above  Current Length of Stay: 2 day(s)    Current Patient Status: Inpatient   Certification Statement: The patient will continue to require additional inpatient hospital stay due to Iv antibiotics, altered mental status     Discharge Plan: Ongoing at this time, likely 48-72 hours     Code Status: Level 1 - Full Code      Subjective:   Patient is unable to answer questions today  Reports that she is at [address] who is a family friend  Unable to answer questions about her condition/symptoms today   Discussed with daughter who states that when she is dehydrated and has an infection this is how she gets  Objective:     Vitals:   Temp (24hrs), Av 1 °F (36 7 °C), Min:97 6 °F (36 4 °C), Max:98 5 °F (36 9 °C)    HR:  [] 88  Resp:  [18-20] 20  BP: (130-166)/(74-85) 166/85  SpO2:  [90 %-100 %] 92 %  Body mass index is 32 13 kg/m²  Input and Output Summary (last 24 hours): Intake/Output Summary (Last 24 hours) at 18 1221  Last data filed at 01/15/18 1900   Gross per 24 hour   Intake                0 ml   Output              675 ml   Net             -675 ml       Physical Exam:     Physical Exam   Constitutional: She appears well-developed and well-nourished  She is uncooperative  Non-toxic appearance  Nasal cannula in place  HENT:   Head: Normocephalic and atraumatic  Mouth/Throat: Mucous membranes are dry  Eyes: Conjunctivae and EOM are normal  Pupils are equal, round, and reactive to light  Neck: Neck supple  Cardiovascular: Normal rate, regular rhythm, S1 normal, S2 normal, normal heart sounds and intact distal pulses  Exam reveals no S3 and no S4  No murmur heard  Pulmonary/Chest: Effort normal  No accessory muscle usage  No respiratory distress  She has no decreased breath sounds  She has no wheezes  She has rhonchi (throughout but patient is breathing through mouth)  She has no rales  She exhibits no tenderness  Abdominal: Soft  Bowel sounds are normal  She exhibits no distension and no mass  There is no tenderness  There is no rigidity, no rebound and no guarding  Neurological: She is alert  She is disoriented (only to self)  Skin: Skin is warm and dry         Additional Data:     Labs:      Results from last 7 days  Lab Units 18  0607  01/15/18  0439   WBC Thousand/uL 4 46  < > 3 77*   HEMOGLOBIN g/dL 9 1*  < > 7 0*   HEMATOCRIT % 32 1*  < > 24 0*   PLATELETS Thousands/uL 164  < > 136*   NEUTROS PCT %  --   --  80*   LYMPHS PCT %  --   --  7*   MONOS PCT %  --   --  9   EOS PCT %  --   --  3   < > = values in this interval not displayed  Results from last 7 days  Lab Units 01/15/18  0633   SODIUM mmol/L 146*  145   POTASSIUM mmol/L 3 3*  3 2*   CHLORIDE mmol/L 111*  110*   CO2 mmol/L 28  28   BUN mg/dL 24  25   CREATININE mg/dL 1 30  1 31*   CALCIUM mg/dL 9 1  8 7   TOTAL PROTEIN g/dL 5 0*   BILIRUBIN TOTAL mg/dL 0 50   ALK PHOS U/L 38*   ALT U/L 17   AST U/L 20   GLUCOSE RANDOM mg/dL 98  96       Results from last 7 days  Lab Units 01/14/18  1448   INR  1 32*       * I Have Reviewed All Lab Data Listed Above  * Additional Pertinent Lab Tests Reviewed: [facility] Admission Reviewed    Imaging:    Imaging Reports Reviewed Today Include: none  Imaging Personally Reviewed by Myself Includes:  None    Recent Cultures (last 7 days):       Results from last 7 days  Lab Units 01/14/18  2207 01/14/18  1550 01/14/18  1448 01/14/18  1447   BLOOD CULTURE   --  No Growth at 24 hrs  No Growth at 24 hrs   --    INFLUENZA A PCR   --   --   --  None Detected   INFLUENZA B PCR   --   --   --  None Detected   RSV PCR   --   --   --  None Detected   LEGIONELLA URINARY ANTIGEN  Negative  --   --   --        Last 24 Hours Medication List:     aspirin 81 mg Oral Daily   cefepime 2,000 mg Intravenous Q24H   docusate sodium 100 mg Oral BID   guaiFENesin 1,200 mg Oral Q12H [address]   heparin (porcine) 5,000 Units Subcutaneous Q8H [address]   iron polysaccharides 150 mg Oral BID   isosorbide mononitrate 30 mg Oral Daily   metroNIDAZOLE 500 mg Intravenous Q8H   nystatin 1 application Topical TID   OLANZapine 2 5 mg Intramuscular Once   pantoprazole 40 mg Oral Daily   predniSONE 10 mg Oral Daily   saccharomyces boulardii 250 mg Oral BID   sodium chloride 75 mL/hr Intravenous Once   tamoxifen 10 mg Oral Daily   vancomycin 12 5 mg/kg (Adjusted) Intravenous Q24H        Today, Patient Was Seen By: Paulina Street PA-C    ** Please Note: Dictation voice to text software may have been used in the creation of this document  **

## 2018-01-17 PROBLEM — G93.40 ACUTE ENCEPHALOPATHY: Status: RESOLVED | Noted: 2018-01-01 | Resolved: 2018-01-01

## 2018-01-17 PROBLEM — Z86.711 HISTORY OF PULMONARY EMBOLUS (PE): Chronic | Status: RESOLVED | Noted: 2018-01-01 | Resolved: 2018-01-01

## 2018-01-17 PROBLEM — D50.9 IRON DEFICIENCY ANEMIA: Chronic | Status: RESOLVED | Noted: 2018-01-01 | Resolved: 2018-01-01

## 2018-01-17 PROBLEM — I96 NECROSIS OF TOE (HCC): Chronic | Status: RESOLVED | Noted: 2018-01-01 | Resolved: 2018-01-01

## 2018-01-17 PROBLEM — E87.0 HYPERNATREMIA: Status: RESOLVED | Noted: 2017-01-01 | Resolved: 2018-01-01

## 2018-01-17 NOTE — ASSESSMENT & PLAN NOTE
· Assessment: Patient likely succumbed to cardiac arrest secondary to primary problem   As above     · Plan:

## 2018-01-17 NOTE — PLAN OF CARE
Problem: Potential for Falls  Goal: Patient will remain free of falls  INTERVENTIONS:  - Assess patient frequently for physical needs  -  Identify cognitive and physical deficits and behaviors that affect risk of falls  -  [place] fall precautions as indicated by assessment   - Educate patient/family on patient safety including physical limitations  - Instruct patient to call for assistance with activity based on assessment  - Modify environment to reduce risk of injury  - Consider OT/PT consult to assist with strengthening/mobility   Outcome: Progressing      Problem: Prexisting or High Potential for Compromised Skin Integrity  Goal: Skin integrity is maintained or improved  INTERVENTIONS:  - Identify patients at risk for skin breakdown  - Assess and monitor skin integrity  - Assess and monitor nutrition and hydration status  - Monitor labs (i e  albumin)  - Assess for incontinence   - Turn and reposition patient  - Assist with mobility/ambulation  - Relieve pressure over bony prominences  - Avoid friction and shearing  - Provide appropriate hygiene as needed including keeping skin clean and dry  - Evaluate need for skin moisturizer/barrier cream  - Collaborate with interdisciplinary team (i e  Nutrition, Rehabilitation, etc )   - Patient/family teaching   Outcome: Progressing      Problem: Nutrition/Hydration-ADULT  Goal: Nutrient/Hydration intake appropriate for improving, restoring or maintaining nutritional needs  Monitor and assess patient's nutrition/hydration status for malnutrition (ex- brittle hair, bruises, dry skin, pale skin and conjunctiva, muscle wasting, smooth red tongue, and disorientation)  Collaborate with interdisciplinary team and initiate plan and interventions as ordered  Monitor patient's weight and dietary intake as ordered or per policy  Utilize nutrition screening tool and intervene per policy   Determine patient's food preferences and provide high-protein, high-caloric foods as appropriate       INTERVENTIONS:  - Monitor oral intake, urinary output, labs, and treatment plans  - Assess nutrition and hydration status and recommend course of action  - Evaluate amount of meals eaten  - Assist patient with eating if necessary   - Allow adequate time for meals  - Recommend/ encourage appropriate diets, oral nutritional supplements, and vitamin/mineral supplements  - Order, calculate, and assess calorie counts as needed  - Recommend, monitor, and adjust tube feedings and TPN/PPN based on assessed needs  - Assess need for intravenous fluids  - Provide specific nutrition/hydration education as appropriate  - Include patient/family/caregiver in decisions related to nutrition   Outcome: Progressing      Problem: DISCHARGE PLANNING - CARE MANAGEMENT  Goal: Discharge to post-acute care or home with appropriate resources  INTERVENTIONS:  - Conduct assessment to determine patient/family and health care team treatment goals, and need for post-acute services based on payer coverage, community resources, and patient preferences, and barriers to discharge  - Address psychosocial, clinical, and financial barriers to discharge as identified in assessment in conjunction with the patient/family and health care team  - Arrange appropriate level of post-acute services according to patients   needs and preference and payer coverage in collaboration with the physician and health care team  - Communicate with and update the patient/family, physician, and health care team regarding progress on the discharge plan  - Arrange appropriate transportation to post-acute venues   Outcome: Progressing

## 2018-01-17 NOTE — ASSESSMENT & PLAN NOTE
· Assessment: This morning at 10:15 patient was very lethargic and only arousable to shaking or sternal rubbing  She would open her eye, mumble, and then go back to sleep  She was requiring 5-6 LPM nasal cannula to maintain adequate oxygenation  She was hypotensive  I called the patient's daughter to discuss her tenuous state and she had decided to make the patient a level 3 DNR/DNI  The patient's  daughter then left to come to the hospital where we decided to further discuss goals of care  I reconvened with the patient's daughter at bedside to find the patient had   On exam there was no heart sounds noted, no spontaneous breath sounds, she was unarousable, and her pupils were fixed and dilated  She was prounounced at 1115  Condolences were given to the family at bedside and support services were offered  They decided on [place]  home and would speak with the patient's nurse if they would want autopsy  · Plan: All orders discontinued

## 2018-01-17 NOTE — PROGRESS NOTES
Nurse reporting increased adventitious breath sounds and increased lethargy with poor UOP  Pt responsive to verbal stimuli on my exam  Lung exam with rhonchi in upper airway  Exams euvolemic  Pt already on abx, refusing oral Mucinex  Continue to encourage oral meds  Will hang bag of D5 1/5 NS as pt with hypernatremia and continue to monitor UOP  Update: Critical care assessment as well, per exam pt is fluid overloaded  Will hold fluids and give 20 mg Lasix, re-check Na level in AM  Can re-start at D5 1/2 NS if sodium does not correct

## 2018-01-17 NOTE — DISCHARGE SUMMARY
[address] [facility]  Discharge- [facility] 1930, 80 y o  female MRN: [id number]    Unit/Bed#: -01 Encounter: [id number]    Primary Care Provider: Clive Robbins MD   Date and time admitted to hospital: 2018  1:35 PM        * Hospital-acquired pneumonia   Assessment & Plan    · Assessment: This morning at 10:15 patient was very lethargic and only arousable to shaking or sternal rubbing  She would open her eye, mumble, and then go back to sleep  She was requiring 5-6 LPM nasal cannula to maintain adequate oxygenation  She was hypotensive  I called the patient's daughter to discuss her tenuous state and she had decided to make the patient a level 3 DNR/DNI  The patient's  daughter then left to come to the hospital where we decided to further discuss goals of care  I reconvened with the patient's daughter at bedside to find the patient had   On exam there was no heart sounds noted, no spontaneous breath sounds, she was unarousable, and her pupils were fixed and dilated  She was prounounced at 1115  Condolences were given to the family at bedside and support services were offered  They decided on [place]  home and would speak with the patient's nurse if they would want autopsy  · Plan: All orders discontinued  Acute respiratory failure with hypoxia [facility])   Assessment & Plan    · Assessment: Patient likely succumbed to cardiac arrest secondary to primary problem  As above     · Plan:                 Consultations During Hospital Stay:  · None    Procedures Performed:     · CXR  · CT head     Significant Findings / Test Results:     · CXR - New small right-sided pleural effusion with right basilar atelectasis or infiltrate   · CT head - No acute intracranial hemorrhage seen  No acute territorial infarction     Incidental Findings:   · As above      Test Results Pending at Discharge (will require follow up):    · None     Outpatient Tests Requested:  · None    Complications:  Cardiac Arrest -      Reason for Admission: HCAP    Hospital Course:     Zach Jackson is a 80 y o  female patient who originally presented to the hospital on 2018 due to HCAP  She was treated aggressively with IV Cefepime, Vancomycin, and Flagyl on admission  She underwent speech evaluation which showed that the patient was a high risk of aspiration  She was ruled out for Flu A/B and RSV which was negative so Vancomycin could be discontinued given it was not post-influenza pneumonia  She continued to decline over her hospital stay and the discussion was had with the patient's daughter to make her a level 3 DNR/DNI  Unfortunately, 1 hour later she had passed  Please see above list of diagnoses and related plan for additional information  Condition at Discharge:      Discharge Day Visit / Exam:     Subjective:  Patient was unable to answer any questions during my first examination  She was only arousable to sternal rub  When I came back to speak with family she had   Vitals: Blood Pressure: (!) 102/42 (18)  Pulse: 97 (18)  Temperature: 99 9 °F (37 7 °C) (18)  Temp Source: Axillary (18)  Respirations: 22 (18)  Height: 5' (152 4 cm) (18)  Weight - Scale: 74 6 kg (164 lb 8 oz) (18 1350)  SpO2: 91 % (18)  Exam:   Physical Exam   Constitutional: Nasal cannula in place  Eyes:   Patient's pupils are fixed, dilated and non-reactive   Neck:   No carotid pulse palpable   Cardiovascular: There are no heart sounds noted   Pulmonary/Chest:   No spontaneous breath sounds  No chest rise or fall  No agonal breathing   Neurological: She is unresponsive  GCS eye subscore is 1  GCS verbal subscore is 1  GCS motor subscore is 1          Discussion with Family: Discussed with family at bedside     Discharge instructions/Information to patient and family:   See after visit summary for information provided to patient and family  Provisions for Follow-Up Care:  See after visit summary for information related to follow-up care and any pertinent home health orders  Disposition:     Other: Patient is     For Discharges to [facility] SNF:   · Not Applicable to this Patient - Not Applicable to this Patient    Planned Readmission: None     Discharge Statement:  I spent 45 minutes discharging the patient  This time was spent on the day of discharge  I had direct contact with the patient on the day of discharge  Greater than 50% of the total time was spent examining patient, answering all patient questions, arranging and discussing plan of care with patient as well as directly providing post-discharge instructions  Additional time then spent on discharge activities  Discharge Medications:  See after visit summary for reconciled discharge medications provided to patient and family        ** Please Note: This note has been constructed using a voice recognition system **

## 2018-01-20 LAB
BACTERIA BLD CULT: NORMAL
BACTERIA BLD CULT: NORMAL